# Patient Record
Sex: FEMALE | Race: BLACK OR AFRICAN AMERICAN | NOT HISPANIC OR LATINO | Employment: STUDENT | URBAN - METROPOLITAN AREA
[De-identification: names, ages, dates, MRNs, and addresses within clinical notes are randomized per-mention and may not be internally consistent; named-entity substitution may affect disease eponyms.]

---
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
## 2023-06-01 ENCOUNTER — OFFICE VISIT (OUTPATIENT)
Dept: FAMILY MEDICINE | Facility: CLINIC | Age: 23
End: 2023-06-01
Payer: COMMERCIAL

## 2023-06-01 VITALS
DIASTOLIC BLOOD PRESSURE: 77 MMHG | HEART RATE: 93 BPM | SYSTOLIC BLOOD PRESSURE: 113 MMHG | TEMPERATURE: 98.3 F | HEIGHT: 68 IN | RESPIRATION RATE: 11 BRPM | WEIGHT: 123.5 LBS | OXYGEN SATURATION: 100 % | BODY MASS INDEX: 18.72 KG/M2

## 2023-06-01 DIAGNOSIS — Z00.00 VISIT FOR PREVENTIVE HEALTH EXAMINATION: Primary | ICD-10-CM

## 2023-06-01 DIAGNOSIS — F33.1 MODERATE EPISODE OF RECURRENT MAJOR DEPRESSIVE DISORDER (H): ICD-10-CM

## 2023-06-01 DIAGNOSIS — Z13.29 SCREENING FOR THYROID DISORDER: ICD-10-CM

## 2023-06-01 DIAGNOSIS — D50.9 IRON DEFICIENCY ANEMIA, UNSPECIFIED IRON DEFICIENCY ANEMIA TYPE: ICD-10-CM

## 2023-06-01 DIAGNOSIS — Z11.4 SCREENING FOR HIV (HUMAN IMMUNODEFICIENCY VIRUS): ICD-10-CM

## 2023-06-01 DIAGNOSIS — Z13.0 SCREENING FOR DEFICIENCY ANEMIA: ICD-10-CM

## 2023-06-01 DIAGNOSIS — L21.9 SEBORRHEIC DERMATITIS: ICD-10-CM

## 2023-06-01 DIAGNOSIS — K90.0 CELIAC DISEASE: ICD-10-CM

## 2023-06-01 DIAGNOSIS — N94.6 DYSMENORRHEA: ICD-10-CM

## 2023-06-01 LAB
ERYTHROCYTE [DISTWIDTH] IN BLOOD BY AUTOMATED COUNT: 17.1 % (ref 10–15)
FERRITIN SERPL-MCNC: 6 NG/ML (ref 6–175)
HCT VFR BLD AUTO: 37.6 % (ref 35–47)
HGB BLD-MCNC: 11.4 G/DL (ref 11.7–15.7)
HIV 1+2 AB+HIV1 P24 AG SERPL QL IA: NONREACTIVE
IRON BINDING CAPACITY (ROCHE): 338 UG/DL (ref 240–430)
IRON SATN MFR SERPL: 5 % (ref 15–46)
IRON SERPL-MCNC: 18 UG/DL (ref 37–145)
MCH RBC QN AUTO: 22.7 PG (ref 26.5–33)
MCHC RBC AUTO-ENTMCNC: 30.3 G/DL (ref 31.5–36.5)
MCV RBC AUTO: 75 FL (ref 78–100)
PLATELET # BLD AUTO: 308 10E3/UL (ref 150–450)
RBC # BLD AUTO: 5.02 10E6/UL (ref 3.8–5.2)
TSH SERPL DL<=0.005 MIU/L-ACNC: 2.38 UIU/ML (ref 0.3–4.2)
WBC # BLD AUTO: 6.4 10E3/UL (ref 4–11)

## 2023-06-01 PROCEDURE — 83550 IRON BINDING TEST: CPT | Performed by: FAMILY MEDICINE

## 2023-06-01 PROCEDURE — 83540 ASSAY OF IRON: CPT | Performed by: FAMILY MEDICINE

## 2023-06-01 PROCEDURE — 36415 COLL VENOUS BLD VENIPUNCTURE: CPT | Performed by: FAMILY MEDICINE

## 2023-06-01 PROCEDURE — 82728 ASSAY OF FERRITIN: CPT | Performed by: FAMILY MEDICINE

## 2023-06-01 PROCEDURE — 85027 COMPLETE CBC AUTOMATED: CPT | Performed by: FAMILY MEDICINE

## 2023-06-01 PROCEDURE — 90471 IMMUNIZATION ADMIN: CPT | Performed by: FAMILY MEDICINE

## 2023-06-01 PROCEDURE — 90651 9VHPV VACCINE 2/3 DOSE IM: CPT | Performed by: FAMILY MEDICINE

## 2023-06-01 PROCEDURE — 99214 OFFICE O/P EST MOD 30 MIN: CPT | Mod: 25 | Performed by: FAMILY MEDICINE

## 2023-06-01 PROCEDURE — 87389 HIV-1 AG W/HIV-1&-2 AB AG IA: CPT | Performed by: FAMILY MEDICINE

## 2023-06-01 PROCEDURE — 84443 ASSAY THYROID STIM HORMONE: CPT | Performed by: FAMILY MEDICINE

## 2023-06-01 PROCEDURE — 99385 PREV VISIT NEW AGE 18-39: CPT | Mod: 25 | Performed by: FAMILY MEDICINE

## 2023-06-01 RX ORDER — TRIAMCINOLONE ACETONIDE 1 MG/G
CREAM TOPICAL 2 TIMES DAILY
Qty: 80 G | Refills: 1 | Status: SHIPPED | OUTPATIENT
Start: 2023-06-01 | End: 2023-11-03

## 2023-06-01 RX ORDER — ESCITALOPRAM OXALATE 10 MG/1
TABLET ORAL
Qty: 30 TABLET | Refills: 1 | Status: SHIPPED | OUTPATIENT
Start: 2023-06-01 | End: 2023-07-10

## 2023-06-01 ASSESSMENT — ENCOUNTER SYMPTOMS
JOINT SWELLING: 0
DIARRHEA: 0
NAUSEA: 0
HEMATURIA: 0
FREQUENCY: 0
COUGH: 0
FEVER: 0
DYSURIA: 0
HEMATOCHEZIA: 0
NERVOUS/ANXIOUS: 1
EYE PAIN: 0
DIZZINESS: 0
SORE THROAT: 0
PARESTHESIAS: 0
ARTHRALGIAS: 0
CHILLS: 0
BREAST MASS: 0
HEADACHES: 0
HEARTBURN: 0
SHORTNESS OF BREATH: 0
PALPITATIONS: 0
ABDOMINAL PAIN: 0
MYALGIAS: 0
CONSTIPATION: 0
WEAKNESS: 0

## 2023-06-01 ASSESSMENT — PAIN SCALES - GENERAL: PAINLEVEL: NO PAIN (0)

## 2023-06-01 NOTE — PATIENT INSTRUCTIONS
Naproxen up to 500 mg every 12 hours  Don't take ibuprofen on same day  Ok to combine acetaminophen

## 2023-06-01 NOTE — PROGRESS NOTES
"Addendum, June 4, 2023, 9:07 PM:  \"Iron levels are low. I suspect that because you have celiac, it is more difficult for your body to absorb iron. Symptoms of iron deficiency include fatigue and possibly depression. I recommend that you start taking an iron supplement once a day. I will send in a prescription. Potential side effects include constipation and dark / green poop. Let me know if the constipation is bothersome.    Thyroid test is normal.  HIV test is normal / negative.\"    Assessment/Plan    Preventive.  -see below orders for screening tests and immunizations    Lump of right earlobe.  Suspect seroma related to recent earring issues.  I do not think there is an active infection.  -Recommend observation.  I suspect that fluid collection will gradually resolve over the next few months    Ear itching.  Exam suggestive of seborrheic dermatitis.  -Start topical triamcinolone as needed    Major depression.  Poorly controlled.  -Check for anemia and hypothyroidism  -Start Lexapro 5 mg daily for 8 days, then 10 mg daily.  Risk of mood changes, GI upset and sexual dysfunction reviewed    Dysmenorrhea.  -Replace ibuprofen with naproxen  -Reviewed with patient that if symptoms are refractory to NSAIDs, we can consider hormonal therapy.  She is strongly hoping to avoid this    F/u in 4-6 weeks for mood.    Orders Placed This Encounter   Procedures     REVIEW OF HEALTH MAINTENANCE PROTOCOL ORDERS     HPV9 (GARDASIL 9)     HIV Antigen Antibody Combo     CBC with platelets     TSH with free T4 reflex     Iron and iron binding capacity     Ferritin     Ferritin     ----  Chief complaint: Physical and Establish Care    Social History     Social History Narrative    Updated 6/4/2023: Grew up in [place]. Lives with roommates. No pets. 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       Patient has been advised of split billing: yes.    Lump of right earlobe.  Patient had piercing in this area for more than 1 " "year.  She began to feel that something was wrong with the piercing.  She took out her earring about a month ago.  A lump appeared.  The lump has been gradually decreasing in size.  Denies recent fever.  The lump is not painful.    Intense menstrual cramps.  Menses are regular, about every 21 days.  Menses last for 5 to 6 days.  During the first 2 days, patient experiences very intense cramping.  She has been taking 400 mg of ibuprofen and some acetaminophen up to every 6 hours.  She would prefer to avoid treating this with hormonal therapy if possible.    Bilateral ear itching.  History of dandruff.    Celiac disease.  Symptoms are well controlled with gluten restriction.  Diagnosed via endoscopy.    History of anemia.  Patient reports that this resolved following celiac treatment.  She also has alpha thalassemia trait.    Depression.  Symptoms have been present for about 6 years.  Patient feels that she is not enjoying things as much.  She often feels sad and isolated.  She also notes intermittent anxiety, including a sensation of gagging when anxious.  This occurs about 2 times per week.  Patient denies recent thoughts of self-harm, though she has experienced these thoughts in the past.  She has never attempted suicide.  Patient feels comfortable sharing her emotions with her sister.  She went to counseling, but found this unhelpful.  She keeps a journal.  She has never tried a depression medication, but she would like to start one.    Patient uses cannabis about once per week during the academic year.  In the summer, she uses cannabis up to 3 times per week.  She occasionally drinks alcohol.  Denies other recent illicit drug use.    Not sexually active.  Denies concerns for pregnancy today.    Exam  /77 (BP Location: Right arm, Patient Position: Sitting, Cuff Size: Adult Regular)   Pulse 93   Temp 98.3  F (36.8  C) (Temporal)   Resp 11   Ht 1.723 m (5' 7.84\")   Wt 56 kg (123 lb 8 oz)   LMP 05/12/2023 " (Exact Date)   SpO2 100%   BMI 18.87 kg/m    Patient's last menstrual period was 05/12/2023 (exact date).    Dryness of skin at entrance to external auditory canals bilaterally.  Small amount of cerumen in EACs.  Visualized portions of tympanic membranes appear normal.    Pea-sized lump of right earlobe.  Nontender.  Punctum present.    No cervical adenopathy.  Lung fields clear to auscultation bilaterally.  Heart with regular rate and rhythm, no murmurs.

## 2023-06-04 PROBLEM — L21.9 SEBORRHEIC DERMATITIS: Status: ACTIVE | Noted: 2023-06-04

## 2023-06-04 PROBLEM — F33.1 MODERATE EPISODE OF RECURRENT MAJOR DEPRESSIVE DISORDER (H): Status: ACTIVE | Noted: 2023-06-04

## 2023-06-04 PROBLEM — D50.9 IRON DEFICIENCY ANEMIA, UNSPECIFIED IRON DEFICIENCY ANEMIA TYPE: Status: ACTIVE | Noted: 2023-06-04

## 2023-06-04 PROBLEM — K90.0 CELIAC DISEASE: Status: ACTIVE | Noted: 2023-06-04

## 2023-06-04 RX ORDER — FERROUS SULFATE 325(65) MG
325 TABLET ORAL
Qty: 90 TABLET | Refills: 3 | Status: SHIPPED | OUTPATIENT
Start: 2023-06-04 | End: 2023-07-11

## 2023-07-09 ASSESSMENT — ANXIETY QUESTIONNAIRES
IF YOU CHECKED OFF ANY PROBLEMS ON THIS QUESTIONNAIRE, HOW DIFFICULT HAVE THESE PROBLEMS MADE IT FOR YOU TO DO YOUR WORK, TAKE CARE OF THINGS AT HOME, OR GET ALONG WITH OTHER PEOPLE: NOT DIFFICULT AT ALL
GAD7 TOTAL SCORE: 5
GAD7 TOTAL SCORE: 5
7. FEELING AFRAID AS IF SOMETHING AWFUL MIGHT HAPPEN: SEVERAL DAYS
2. NOT BEING ABLE TO STOP OR CONTROL WORRYING: SEVERAL DAYS
6. BECOMING EASILY ANNOYED OR IRRITABLE: NOT AT ALL
3. WORRYING TOO MUCH ABOUT DIFFERENT THINGS: SEVERAL DAYS
5. BEING SO RESTLESS THAT IT IS HARD TO SIT STILL: NOT AT ALL
4. TROUBLE RELAXING: SEVERAL DAYS
1. FEELING NERVOUS, ANXIOUS, OR ON EDGE: SEVERAL DAYS

## 2023-07-10 ENCOUNTER — OFFICE VISIT (OUTPATIENT)
Dept: FAMILY MEDICINE | Facility: CLINIC | Age: 23
End: 2023-07-10
Payer: COMMERCIAL

## 2023-07-10 VITALS
OXYGEN SATURATION: 99 % | RESPIRATION RATE: 14 BRPM | BODY MASS INDEX: 18.41 KG/M2 | HEART RATE: 101 BPM | WEIGHT: 121.5 LBS | SYSTOLIC BLOOD PRESSURE: 116 MMHG | TEMPERATURE: 97.4 F | DIASTOLIC BLOOD PRESSURE: 76 MMHG | HEIGHT: 68 IN

## 2023-07-10 DIAGNOSIS — F33.1 MODERATE EPISODE OF RECURRENT MAJOR DEPRESSIVE DISORDER (H): ICD-10-CM

## 2023-07-10 DIAGNOSIS — D50.9 IRON DEFICIENCY ANEMIA, UNSPECIFIED IRON DEFICIENCY ANEMIA TYPE: ICD-10-CM

## 2023-07-10 DIAGNOSIS — F41.1 GAD (GENERALIZED ANXIETY DISORDER): Primary | ICD-10-CM

## 2023-07-10 DIAGNOSIS — L72.0 EPIDERMAL CYST OF EAR: ICD-10-CM

## 2023-07-10 PROBLEM — D56.3 ALPHA THALASSEMIA TRAIT: Status: ACTIVE | Noted: 2023-07-10

## 2023-07-10 PROCEDURE — 99214 OFFICE O/P EST MOD 30 MIN: CPT | Performed by: FAMILY MEDICINE

## 2023-07-10 RX ORDER — HYDROXYZINE HYDROCHLORIDE 10 MG/1
10-20 TABLET, FILM COATED ORAL 3 TIMES DAILY PRN
Qty: 40 TABLET | Refills: 1 | Status: SHIPPED | OUTPATIENT
Start: 2023-07-10 | End: 2023-11-01

## 2023-07-10 RX ORDER — ESCITALOPRAM OXALATE 10 MG/1
10 TABLET ORAL DAILY
Qty: 90 TABLET | Refills: 1 | Status: SHIPPED | OUTPATIENT
Start: 2023-07-10 | End: 2023-07-26

## 2023-07-10 ASSESSMENT — ANXIETY QUESTIONNAIRES: GAD7 TOTAL SCORE: 5

## 2023-07-10 ASSESSMENT — PAIN SCALES - GENERAL: PAINLEVEL: NO PAIN (0)

## 2023-07-10 NOTE — PROGRESS NOTES
Assessment/Plan.    Depression.  Improving.  Continue Lexapro at 10 mg daily.    Generalized anxiety.  No significant improvement with Lexapro.  Limit caffeine intake to no more than 100 mg daily.  Start hydroxyzine 10 to 20 mg up to 3 times daily as needed.  Risk of sedation reviewed.  Encouraged patient not to use hydroxyzine on days that she consumes alcohol.    Mild iron deficiency.  Likely secondary to celiac disease.  Patient reports that iron levels were much lower 1 to 2 years ago and that she responded well to an iron infusion through [facility].  She has not tolerated oral iron in the past.  Recent ferritin level was borderline-low.  Encouraged patient to restart oral iron at a lower dose; she can take 65 mg of elemental iron once weekly or can use a lower-dose formulation more often.    Epidermal cyst of right earlobe.  Unfortunately, this has not self-resolved.  Patient desires excision.  Will refer to dermatology.    F/u in 2 months.    Orders Placed This Encounter   Procedures     Adult Dermatology Referral     ----  Chief complaint: Follow Up (From 6/1) and Recheck Medication    Social History     Social History Narrative    Updated 6/4/2023: Grew up in [place]. Lives with roommates. No pets. 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     At last visit, we started Lexapro.  Patient reports that depressive symptoms have improved.  She initially experienced a dull headache and then also noticed some GI upset following dose increase, but side effects have now resolved.    Anxiety symptoms are about the same.  Patient experiences episodes of intense anxiety about 4 times per week.  These sometimes interfere with her ability to sleep.    Cannabis use is stable.  Patient does not feel that cannabis use is a problem.  She reports that she does not use cannabis during the school year.    Patient has been taking melatonin to help with sleep.  She occasionally takes Allegra.  She also uses  "caffeine pills.  Recently, she has been taking about 100 mg of caffeine per day.  During the school year, she takes up to 300 mg/day.    Exam  /76   Pulse 101   Temp 97.4  F (36.3  C) (Temporal)   Resp 14   Ht 1.73 m (5' 8.11\")   Wt 55.1 kg (121 lb 8 oz)   LMP 07/03/2023 (Exact Date)   SpO2 99%   BMI 18.41 kg/m    Patient's last menstrual period was 07/03/2023 (exact date).     Pea-sized lump of right earlobe, nontender.  "

## 2023-07-10 NOTE — PROGRESS NOTES
"  {PROVIDER CHARTING PREFERENCE:704578}    Matilde Art is a 22 year old, presenting for the following health issues:  Follow Up (From 6/1) and Recheck Medication        7/10/2023     9:52 AM   Additional Questions   Roomed by Monica Pereyra     History of Present Illness       Mental Health Follow-up:  Patient presents to follow-up on Depression & Anxiety.Patient's depression since last visit has been:  Better  The patient is not having other symptoms associated with depression.  Patient's anxiety since last visit has been:  No change  The patient is not having other symptoms associated with anxiety.  Any significant life events: No  Patient is feeling anxious or having panic attacks.  Patient has no concerns about alcohol or drug use.    She eats 2-3 servings of fruits and vegetables daily.She consumes 0 sweetened beverage(s) daily.She exercises with enough effort to increase her heart rate 20 to 29 minutes per day.  She exercises with enough effort to increase her heart rate 5 days per week.   She is taking medications regularly.  Today's WALTER-7 Score: 5       {SUPERLIST (Optional):174018}  {additonal problems for provider to add (Optional):865401}      Review of Systems   {ROS COMP (Optional):484431}      Objective    /76   Pulse 101   Temp 97.4  F (36.3  C) (Temporal)   Resp 14   Ht 1.73 m (5' 8.11\")   Wt 55.1 kg (121 lb 8 oz)   LMP 07/03/2023 (Exact Date)   SpO2 99%   BMI 18.41 kg/m    Body mass index is 18.41 kg/m .  Physical Exam   {Exam List (Optional):283892}    {Diagnostic Test Results (Optional):399653}    {AMBULATORY ATTESTATION (Optional):565439}            "

## 2023-07-11 RX ORDER — FERROUS SULFATE 325(65) MG
325 TABLET ORAL WEEKLY
Start: 2023-07-11 | End: 2023-09-18

## 2023-07-26 DIAGNOSIS — F33.1 MODERATE EPISODE OF RECURRENT MAJOR DEPRESSIVE DISORDER (H): ICD-10-CM

## 2023-07-26 RX ORDER — ESCITALOPRAM OXALATE 10 MG/1
10 TABLET ORAL DAILY
Qty: 90 TABLET | Refills: 1 | Status: SHIPPED | OUTPATIENT
Start: 2023-07-26 | End: 2024-09-25

## 2023-07-26 NOTE — TELEPHONE ENCOUNTER
"Requested Prescriptions   Pending Prescriptions Disp Refills    escitalopram (LEXAPRO) 10 MG tablet 90 tablet 1     Sig: Take 1 tablet (10 mg) by mouth daily       SSRIs Protocol Failed - 7/26/2023 11:35 AM        Failed - PHQ-9 score less than 5 in past 6 months     Please review last PHQ-9 score.           Passed - Medication is active on med list        Passed - Patient is age 18 or older        Passed - No active pregnancy on record        Passed - No positive pregnancy test in last 12 months        Passed - Recent (6 mo) or future (30 days) visit within the authorizing provider's specialty     Patient had office visit in the last 6 months or has a visit in the next 30 days with authorizing provider or within the authorizing provider's specialty.  See \"Patient Info\" tab in inbasket, or \"Choose Columns\" in Meds & Orders section of the refill encounter.                Routing refill request to provider for review/approval because medication did not pass protocol.    Pt was last seen on 07/10/23    Carol Kelly RN  [facility]   "

## 2023-09-18 ENCOUNTER — OFFICE VISIT (OUTPATIENT)
Dept: OBGYN | Facility: CLINIC | Age: 23
End: 2023-09-18
Attending: OBSTETRICS & GYNECOLOGY
Payer: COMMERCIAL

## 2023-09-18 VITALS
SYSTOLIC BLOOD PRESSURE: 122 MMHG | HEIGHT: 68 IN | DIASTOLIC BLOOD PRESSURE: 85 MMHG | BODY MASS INDEX: 19.85 KG/M2 | HEART RATE: 76 BPM | WEIGHT: 131 LBS

## 2023-09-18 DIAGNOSIS — N94.6 DYSMENORRHEA: Primary | ICD-10-CM

## 2023-09-18 PROCEDURE — G0463 HOSPITAL OUTPT CLINIC VISIT: HCPCS | Performed by: OBSTETRICS & GYNECOLOGY

## 2023-09-18 PROCEDURE — 99203 OFFICE O/P NEW LOW 30 MIN: CPT | Performed by: OBSTETRICS & GYNECOLOGY

## 2023-09-18 ASSESSMENT — PAIN SCALES - GENERAL: PAINLEVEL: MODERATE PAIN (4)

## 2023-09-18 NOTE — PATIENT INSTRUCTIONS
Lysteda - This medication is primarily used for heavy periods, but by shortening the amount of bleeding we may indirectly help with the pain of your periods.    https://www.aafp.org/pubs/afp/issues/2011/1015/p883.html    Try taking a dose or two of ibuprofen the day before you anticipate your period pain coming back.    Consider a Mirena/progestin IUD for pain.  https://www.[employer].Joome/Mirena_Patient_Brochure_Digital.pdf?inline    After your ultrasound, we can think about whether we should move forward with a diagnostic laparoscopy to look for endometriosis.    Thank you for trusting us with your care!     If you need to contact us for questions about:  Symptoms, Scheduling & Medical Questions; Non-urgent (2-3 day response) [facility] message, Urgent (needing response today) [phone] (if after 3:30pm next day response)   Prescriptions: Please call your Pharmacy   Billing: Jeff [phone] or [facility]:[phone]

## 2023-09-18 NOTE — PROGRESS NOTES
Chief Complaint   Patient presents with    Establish Care     C/O cramping    Gabriela Quevedo LPN

## 2023-09-18 NOTE — LETTER
9/18/2023       RE: Kaelyn Munoz  [address] [facility] [address]     Dear Colleague,    Thank you for referring your patient, Kaelyn Munoz, to the [facility] [place] [facility]. Please see a copy of my visit note below.    Chief Complaint   Patient presents with    Establish Care     C/O sherwin Quevedo LPN     Since age 16    Started having periods at age 13    Starting in high school, they have been painful, havent changed since    Roughly monthly, regular,     Last about 5-6 days  Pain severe is first 2 days  Takes pain killers the first 3 days  Not heavy  Blood doesn't bother her  Changes super tampons, changes every 5 hours (first 2 days)    Stomach is painful     No dyschezia - stomach hurts   No dysuria  No dyspareunia    A thal trait - no treatments. Really anemic, has had iron infusions in the past  Celiac disease - diagnosed July 2021  No major complications from celiac    Endoscopy related to he celiac diagnosis    Left fractured elbow    G0  No pap smear history     Family history  Breast cancer dad's side, great aunt (grandpa's sister)  Dad has high blood pressure and gout  Painful periods? No     Allergies  None         [facility]  Gynecology Consult Visit    SUBJECTIVE    Kaelyn Munoz is a 23 year old G0 who is here for dysmenorrhea.    Kaelyn describes that periods have been painful since she was 16. She experiences monthly menses which last about 5-6 days. The first two days are very painful. She does use tylenol and ibuprofen about every 4-6 hours for the first three days with minimal relief. She does not think the flow is heavy, on the first two days she uses a super tampon which she changes about every 5 hours. She states the pain occurs in her stomach. No associated dyschezia, dysuria, or dyspareunia.     She hasn't had any previous lab or imaging evaluation  "for these painful periods, but her primary care provider suggested the use of tylenol/ibuprofen. She was offered hormonal contraception but she is not interested in this as she is worried that it could affect her mood. She has similarly received this advice from her grandfather, who is an OBGYN.    Her LMP is: Patient's last menstrual period was 2023.    PAST MEDICAL HISTORY  Past Medical History:   Diagnosis Date    Alpha thalassemia trait     Celiac disease      MEDICATIONS  Current Outpatient Medications   Medication    escitalopram (LEXAPRO) 10 MG tablet    hydrOXYzine (ATARAX) 10 MG tablet    triamcinolone (KENALOG) 0.1 % external cream     No current facility-administered medications for this visit.     ALLERGIES  No Known Allergies    OBSTETRIC/GYNECOLOGIC HISTORY  Menarche: 13  Period cycle/length/flow/associated symptoms: monthly, painful, lasts 5-6 days  Current contraception: none  No prior pap smears    OB History    Para Term  AB Living   0 0 0 0 0 0   SAB IAB Ectopic Multiple Live Births   0 0 0 0 0       PAST SURGICAL HISTORY   Past Surgical History:   Procedure Laterality Date    EGD  2021    WISDOM TOOTH EXTRACTION         SOCIAL HISTORY    Social History     Tobacco Use    Smoking status: Never    Smokeless tobacco: Never   Vaping Use    Vaping Use: Never used   Substance Use Topics    Alcohol use: Yes    Drug use: Yes     Types: Marijuana       FAMILY HISTORY    Family History   Problem Relation Age of Onset    Gout Father     Hypertension Father    Remote breast cancer history - paternal great aunt    OBJECTIVE  /85   Pulse 76   Ht 1.727 m (5' 8\")   Wt 59.4 kg (131 lb)   LMP 2023   BMI 19.92 kg/m      General: Alert, without distress  HEENT: normocephalic, without obvious abnormality   Exam deferred today    LABS:   Component      Latest Ref Rng 2023  11:00 AM 2023  11:01 AM 2023  11:33 AM   WBC      4.0 - 11.0 10e3/uL 6.4      RBC Count      " 3.80 - 5.20 10e6/uL 5.02      Hemoglobin      11.7 - 15.7 g/dL 11.4 (L)      Hematocrit      35.0 - 47.0 % 37.6      MCV      78 - 100 fL 75 (L)      MCH      26.5 - 33.0 pg 22.7 (L)      MCHC      31.5 - 36.5 g/dL 30.3 (L)      RDW      10.0 - 15.0 % 17.1 (H)      Platelet Count      150 - 450 10e3/uL 308      Iron      37 - 145 ug/dL  18 (L)     Iron Binding Capacity      240 - 430 ug/dL  338     Iron Sat Index      15 - 46 %  5 (L)     TSH      0.30 - 4.20 uIU/mL  2.38     Ferritin      6 - 175 ng/mL   6           ASSESSMENT  Kaelyn Munoz is a 23 year old G0 with dysmenorrhea.    PLAN   -discussed common causes of dysmenorrhea, including primary dysmenorrhea, endometriosis, adenomyosis, fibroids, among other causes. Agreeable to begin workup with US. Depending on result, Kaelyn would consider a diagnostic laparoscopy.  -discussed trying ibuprofen the day prior to the anticipation of menses, to interrupt the inflammatory cytokines that cause painful menses. However, I believe that this is likely to be insufficient in management. Discussed the following options:   -lysteda: reviewed that it is primarily used for management of heavy periods, but that by reducing the amount of bleeding, we may indirectly improve pain   -reviewed the role of hormonal contraception, and the use of ovulatory suppression in individuals with painful menses. Discussed the data that is known about hormonal contraception and mood. Discussed the use of a progestin IUD, which have been shown to both improve pain while also minimizing systemic/brain exposure to hormones. Kaelyn will continue to think about this.    Kaelyn will think about these management options. We will discuss again if she is interested in anything after the ultrasound.    Antonietta Andersen MD, MSCI, FACOG    [facility]  September 18, 2023

## 2023-09-27 ENCOUNTER — ANCILLARY PROCEDURE (OUTPATIENT)
Dept: ULTRASOUND IMAGING | Facility: CLINIC | Age: 23
End: 2023-09-27
Attending: OBSTETRICS & GYNECOLOGY
Payer: COMMERCIAL

## 2023-09-27 DIAGNOSIS — N94.6 DYSMENORRHEA: ICD-10-CM

## 2023-09-27 PROCEDURE — 76830 TRANSVAGINAL US NON-OB: CPT | Mod: 26 | Performed by: OBSTETRICS & GYNECOLOGY

## 2023-09-27 PROCEDURE — 76830 TRANSVAGINAL US NON-OB: CPT

## 2023-10-02 ENCOUNTER — TELEPHONE (OUTPATIENT)
Dept: OBGYN | Facility: CLINIC | Age: 23
End: 2023-10-02
Payer: COMMERCIAL

## 2023-10-02 ENCOUNTER — PREP FOR PROCEDURE (OUTPATIENT)
Dept: OBGYN | Facility: CLINIC | Age: 23
End: 2023-10-02
Payer: COMMERCIAL

## 2023-10-02 DIAGNOSIS — N92.0 MENORRHAGIA WITH REGULAR CYCLE: Primary | ICD-10-CM

## 2023-10-02 DIAGNOSIS — N84.0 ENDOMETRIAL POLYP: ICD-10-CM

## 2023-10-02 DIAGNOSIS — N94.6 MENSES PAINFUL: ICD-10-CM

## 2023-10-02 DIAGNOSIS — N92.0 MENORRHAGIA WITH REGULAR CYCLE: ICD-10-CM

## 2023-10-02 DIAGNOSIS — N94.6 MENSES PAINFUL: Primary | ICD-10-CM

## 2023-10-02 RX ORDER — ACETAMINOPHEN 325 MG/1
975 TABLET ORAL ONCE
Status: CANCELLED | OUTPATIENT
Start: 2023-10-02 | End: 2023-10-02

## 2023-10-02 NOTE — TELEPHONE ENCOUNTER
Called to review US which shows likely endometrial polyp.    Discussed that polyp is definitely contributing to heaviness of menses, but may or may not be the primary cause of the pain with menses. Offered two routes of evaluation/treatment:     1) only hysteroscopy/polypectomy, then see what symptoms are improved/how much pain is resolved.  2) combined diagnostic laparoscopy with hysteroscopy, and rule out endometriosis along with removing the polyp.    Kaelyn prefers the latter option. Case request entered. Will need a pre-op physical within 30d of surgery.    Antonietta Andersen MD, MSCI, FACOG    [facility]  October 2, 2023

## 2023-10-04 ENCOUNTER — MYC MEDICAL ADVICE (OUTPATIENT)
Dept: OBGYN | Facility: CLINIC | Age: 23
End: 2023-10-04
Payer: COMMERCIAL

## 2023-10-06 PROBLEM — N94.6 MENSES PAINFUL: Status: ACTIVE | Noted: 2023-10-02

## 2023-10-06 PROBLEM — N84.0 ENDOMETRIAL POLYP: Status: ACTIVE | Noted: 2023-10-02

## 2023-10-06 PROBLEM — N92.0 MENORRHAGIA WITH REGULAR CYCLE: Status: ACTIVE | Noted: 2023-10-02

## 2023-10-09 ENCOUNTER — TELEPHONE (OUTPATIENT)
Dept: OBGYN | Facility: CLINIC | Age: 23
End: 2023-10-09
Payer: COMMERCIAL

## 2023-10-09 NOTE — TELEPHONE ENCOUNTER
SHON for patient to call back to schedule procedure. Recent phone call with patient she expressed wanting a Friday date for her procedure. Writer has a few openings for Friday before the new year.     Sandhya CORTES

## 2023-11-01 ENCOUNTER — OFFICE VISIT (OUTPATIENT)
Dept: FAMILY MEDICINE | Facility: CLINIC | Age: 23
End: 2023-11-01
Payer: COMMERCIAL

## 2023-11-01 VITALS
HEIGHT: 69 IN | HEART RATE: 66 BPM | OXYGEN SATURATION: 98 % | BODY MASS INDEX: 20.73 KG/M2 | TEMPERATURE: 98.3 F | RESPIRATION RATE: 14 BRPM | SYSTOLIC BLOOD PRESSURE: 104 MMHG | WEIGHT: 140 LBS | DIASTOLIC BLOOD PRESSURE: 68 MMHG

## 2023-11-01 DIAGNOSIS — D56.3 ALPHA THALASSEMIA TRAIT: ICD-10-CM

## 2023-11-01 DIAGNOSIS — Z23 NEED FOR VACCINATION: ICD-10-CM

## 2023-11-01 DIAGNOSIS — Z01.818 PREOPERATIVE EXAMINATION: Primary | ICD-10-CM

## 2023-11-01 DIAGNOSIS — D50.9 IRON DEFICIENCY ANEMIA, UNSPECIFIED IRON DEFICIENCY ANEMIA TYPE: ICD-10-CM

## 2023-11-01 DIAGNOSIS — N94.6 MENSES PAINFUL: ICD-10-CM

## 2023-11-01 PROCEDURE — 99214 OFFICE O/P EST MOD 30 MIN: CPT | Mod: 25 | Performed by: FAMILY MEDICINE

## 2023-11-01 PROCEDURE — 90471 IMMUNIZATION ADMIN: CPT | Performed by: FAMILY MEDICINE

## 2023-11-01 PROCEDURE — 90480 ADMN SARSCOV2 VAC 1/ONLY CMP: CPT | Performed by: FAMILY MEDICINE

## 2023-11-01 PROCEDURE — 91320 SARSCV2 VAC 30MCG TRS-SUC IM: CPT | Performed by: FAMILY MEDICINE

## 2023-11-01 PROCEDURE — 90686 IIV4 VACC NO PRSV 0.5 ML IM: CPT | Performed by: FAMILY MEDICINE

## 2023-11-01 ASSESSMENT — ENCOUNTER SYMPTOMS
EYE PAIN: 0
COUGH: 0
MYALGIAS: 0
DIZZINESS: 0
FREQUENCY: 0
BRUISES/BLEEDS EASILY: 0
ABDOMINAL PAIN: 0
ALLERGIC/IMMUNOLOGIC NEGATIVE: 1
FEVER: 0
PSYCHIATRIC NEGATIVE: 1
CHILLS: 0
PALPITATIONS: 0
DYSURIA: 0
SHORTNESS OF BREATH: 0
CONSTIPATION: 0
ARTHRALGIAS: 0
HEADACHES: 0
SORE THROAT: 0
WEAKNESS: 0
DIARRHEA: 0

## 2023-11-01 ASSESSMENT — PATIENT HEALTH QUESTIONNAIRE - PHQ9
10. IF YOU CHECKED OFF ANY PROBLEMS, HOW DIFFICULT HAVE THESE PROBLEMS MADE IT FOR YOU TO DO YOUR WORK, TAKE CARE OF THINGS AT HOME, OR GET ALONG WITH OTHER PEOPLE: NOT DIFFICULT AT ALL
SUM OF ALL RESPONSES TO PHQ QUESTIONS 1-9: 2
SUM OF ALL RESPONSES TO PHQ QUESTIONS 1-9: 2

## 2023-11-01 ASSESSMENT — PAIN SCALES - GENERAL: PAINLEVEL: NO PAIN (0)

## 2023-11-01 NOTE — PROGRESS NOTES
[facility]  [address] [place] [address] SO  [address]  [facility] [address]  Phone: [phone]  Fax: [phone]  Primary Provider: Kemar Rodriguez  Pre-op Performing Provider: ELAINE TORREZ    PREOPERATIVE EVALUATION:  Today's date: 11/1/2023    Kaelyn is a 23 year old female who presents for a preoperative evaluation.      11/1/2023     8:48 AM   Additional Questions   Roomed by Jerome OTTO RN       Surgical Information:  Surgery/Procedure: LAPASCOPY, DIAGNOSTIC, possible fulguration or excision of endometriosis; hysteroscopy with morcellation of endometrial polyp AND Dilation and curettage, operative hysteroscopy with morcellator   [place] [facility] [place]geon: SILVIA LUCERO MD  Surgery Date: 11/3/2023  Time of Surgery: 9:10 AM  Where patient plans to recover: At home with family  Fax number for surgical facility: Note does not need to be faxed, will be available electronically in Epic.    Assessment & Plan     The proposed surgical procedure is considered INTERMEDIATE risk.    Preoperative examination  Menses painful  Low risk for intermediate risk procedure   Pt reports that she usually needs additional doses of anesthesia at the dentist.  Had endoscopy without concern. Anesthesia to be cognizant of this    Need for vaccination  - INFLUENZA VACCINE IM > 6 MONTHS VALENT IIV4 (AFLURIA/FLUZONE)  - COVID-19 12+ (2023-24) ([employer])    Alpha thalassemia trait  Iron deficiency anemia, unspecified iron deficiency anemia type  - CBC with platelets; Future     - No identified additional risk factors other than previously addressed    RECOMMENDATION:  APPROVAL GIVEN to proceed with proposed procedure, without further diagnostic evaluation.    Subjective       HPI related to upcoming procedure:     Painful menses  Looking for endometriosis     Hysteroscopy and laparoscopy     Able to do 4+ METS regularly without shortness of breath         11/1/2023     8:41 AM   Preop Questions   1. Have you  ever had a heart attack or stroke? No   2. Have you ever had surgery on your heart or blood vessels, such as a stent placement, a coronary artery bypass, or surgery on an artery in your head, neck, heart, or legs? No   3. Do you have chest pain with activity? No   4. Do you have a history of  heart failure? No   5. Do you currently have a cold, bronchitis or symptoms of other infection? No   6. Do you have a cough, shortness of breath, or wheezing? No   7. Do you or anyone in your family have previous history of blood clots? No   8. Do you or does anyone in your family have a serious bleeding problem such as prolonged bleeding following surgeries or cuts? No   9. Have you ever had problems with anemia or been told to take iron pills? YES - alpha thalassemia trait   Checking cbc today    10. Have you had any abnormal blood loss such as black, tarry or bloody stools, or abnormal vaginal bleeding? No   11. Have you ever had a blood transfusion? No   12. Are you willing to have a blood transfusion if it is medically needed before, during, or after your surgery? Yes   13. Have you or any of your relatives ever had problems with anesthesia? YES - she reports that she usually needs additional anesthesia compared to others.  She has been aware through several procedures in the past.     14. Do you have sleep apnea, excessive snoring or daytime drowsiness? No   15. Do you have any artifical heart valves or other implanted medical devices like a pacemaker, defibrillator, or continuous glucose monitor? No   16. Do you have artificial joints? No   17. Are you allergic to latex? No   18. Is there any chance that you may be pregnant? No       Health Care Directive:  Patient does not have a Health Care Directive or Living Will:   FULL CODE     Preoperative Review of :   reviewed - no record of controlled substances prescribed.    Review of Systems   Constitutional:  Negative for chills and fever.   HENT:  Negative for  congestion, ear pain and sore throat.    Eyes:  Negative for pain and visual disturbance.   Respiratory:  Negative for cough and shortness of breath.    Cardiovascular:  Negative for chest pain, palpitations and peripheral edema.   Gastrointestinal:  Negative for abdominal pain, constipation and diarrhea.   Endocrine: Negative for polyuria.   Genitourinary:  Negative for dysuria, frequency and vaginal discharge.   Musculoskeletal:  Negative for arthralgias and myalgias.   Skin:  Negative for rash.   Allergic/Immunologic: Negative.    Neurological:  Negative for dizziness, weakness and headaches.   Hematological:  Does not bruise/bleed easily.   Psychiatric/Behavioral: Negative.         Patient Active Problem List    Diagnosis Date Noted    Menorrhagia with regular cycle 10/02/2023     Priority: Medium    Endometrial polyp 10/02/2023     Priority: Medium    Menses painful 10/02/2023     Priority: Medium    Alpha thalassemia trait 07/10/2023     Priority: Medium    Celiac disease 06/04/2023     Priority: Medium    Seborrheic dermatitis 06/04/2023     Priority: Medium    Moderate episode of recurrent major depressive disorder (H) 06/04/2023     Priority: Medium    Iron deficiency anemia, unspecified iron deficiency anemia type 06/04/2023     Priority: Medium      Past Medical History:   Diagnosis Date    Alpha thalassemia trait     Celiac disease      Past Surgical History:   Procedure Laterality Date    EGD  07/2021    WISDOM TOOTH EXTRACTION       Current Outpatient Medications   Medication Sig Dispense Refill    escitalopram (LEXAPRO) 10 MG tablet Take 1 tablet (10 mg) by mouth daily 90 tablet 1    triamcinolone (KENALOG) 0.1 % external cream Apply topically 2 times daily Apply to ear canal as needed for itching. (Patient not taking: Reported on 11/1/2023) 80 g 1       No Known Allergies     Social History     Tobacco Use    Smoking status: Never    Smokeless tobacco: Never   Substance Use Topics    Alcohol use: Yes  "    Family History   Problem Relation Age of Onset    Gout Father     Hypertension Father      History   Drug Use    Types: Marijuana         Objective     /68 (BP Location: Left arm, Patient Position: Sitting, Cuff Size: Adult Regular)   Pulse 66   Temp 98.3  F (36.8  C) (Temporal)   Resp 14   Ht 1.753 m (5' 9\")   Wt 63.5 kg (140 lb)   LMP 09/16/2023   SpO2 98%   BMI 20.67 kg/m      Physical Exam  Constitutional:       General: She is not in acute distress.  HENT:      Head: Normocephalic.      Right Ear: Tympanic membrane normal.      Left Ear: Tympanic membrane normal.      Mouth/Throat:      Mouth: Mucous membranes are moist.      Pharynx: Oropharynx is clear.   Eyes:      General: No scleral icterus.  Cardiovascular:      Rate and Rhythm: Normal rate and regular rhythm.      Heart sounds: Normal heart sounds.   Pulmonary:      Effort: No respiratory distress.      Breath sounds: Normal breath sounds.   Abdominal:      General: Abdomen is flat. Bowel sounds are normal.      Palpations: Abdomen is soft.   Musculoskeletal:      Cervical back: No tenderness.      Right lower leg: No edema.      Left lower leg: No edema.   Lymphadenopathy:      Cervical: No cervical adenopathy.   Skin:     General: Skin is warm.   Neurological:      General: No focal deficit present.      Mental Status: She is alert.   Psychiatric:         Mood and Affect: Mood normal.         Behavior: Behavior normal.         Recent Labs   Lab Test 06/01/23  1100   HGB 11.4*           Diagnostics:  Labs pending at this time.  Results will be reviewed when available.   No EKG required, no history of coronary heart disease, significant arrhythmia, peripheral arterial disease or other structural heart disease.    Revised Cardiac Risk Index (RCRI):  The patient has the following serious cardiovascular risks for perioperative complications:   - No serious cardiac risks = 0 points     RCRI Interpretation: 0 points: Class I (very low " risk - 0.4% complication rate)         Signed Electronically by: Heath Childers DO  Copy of this evaluation report is provided to requesting physician.

## 2023-11-02 ENCOUNTER — ANESTHESIA EVENT (OUTPATIENT)
Dept: SURGERY | Facility: AMBULATORY SURGERY CENTER | Age: 23
End: 2023-11-02
Payer: COMMERCIAL

## 2023-11-02 NOTE — ANESTHESIA PREPROCEDURE EVALUATION
Anesthesia Pre-Procedure Evaluation    Patient: Kaelyn Munoz   MRN: [id number] : 2000        Procedure : Procedure(s):  diagnostic laparoscopy, possible fulguration or excision of endometriosis; hysteroscopy with morcellation of endometrial polyp  Dilation and curettage, operative hysteroscopy with morcellator          Past Medical History:   Diagnosis Date    Alpha thalassemia trait     Celiac disease       Past Surgical History:   Procedure Laterality Date    EGD  2021    WISDOM TOOTH EXTRACTION        No Known Allergies   Social History     Tobacco Use    Smoking status: Never    Smokeless tobacco: Never   Substance Use Topics    Alcohol use: Yes      Wt Readings from Last 1 Encounters:   23 63.5 kg (140 lb)        Anesthesia Evaluation   Pt has had prior anesthetic.     No history of anesthetic complications       ROS/MED HX  ENT/Pulmonary:  - neg pulmonary ROS     Neurologic:  - neg neurologic ROS     Cardiovascular:  - neg cardiovascular ROS     METS/Exercise Tolerance: >4 METS    Hematologic:     (+)      anemia,          Musculoskeletal:  - neg musculoskeletal ROS     GI/Hepatic: Comment: Celiac disease      Renal/Genitourinary:  - neg Renal ROS     Endo:  - neg endo ROS     Psychiatric/Substance Use:     (+) psychiatric history depression       Infectious Disease:  - neg infectious disease ROS     Malignancy:  - neg malignancy ROS     Other:  - neg other ROS          Physical Exam    Airway  airway exam normal      Mallampati: I   TM distance: > 3 FB   Neck ROM: full   Mouth opening: > 3 cm    Respiratory Devices and Support         Dental       (+) Completely normal teeth      Cardiovascular   cardiovascular exam normal       Rhythm and rate: regular and normal     Pulmonary   pulmonary exam normal        breath sounds clear to auscultation           OUTSIDE LABS:  CBC:   Lab Results   Component Value Date    WBC 6.4 2023    WBC 6.6 2021    HGB 11.4 (L) 2023     "HGB 8.2 (L) 07/22/2021    HCT 37.6 06/01/2023    HCT 29.4 (L) 07/22/2021     06/01/2023     07/22/2021     BMP:   Lab Results   Component Value Date     06/01/2021    POTASSIUM 4.1 06/01/2021    CHLORIDE 103 06/01/2021    CO2 21 (L) 06/01/2021    BUN 14 06/01/2021    CR 0.67 06/01/2021    GLC 84 06/01/2021     COAGS: No results found for: \"PTT\", \"INR\", \"FIBR\"  POC: No results found for: \"BGM\", \"HCG\", \"HCGS\"  HEPATIC:   Lab Results   Component Value Date    ALBUMIN 4.5 06/01/2021    PROTTOTAL 7.6 06/01/2021    ALT <9 06/01/2021    AST 13 06/01/2021    ALKPHOS 116 06/01/2021    BILITOTAL 0.7 06/01/2021     OTHER:   Lab Results   Component Value Date    YASIR 9.5 06/01/2021    TSH 2.38 06/01/2023       Anesthesia Plan    ASA Status:  1    NPO Status:  NPO Appropriate    Anesthesia Type: General.     - Airway: ETT   Induction: Intravenous, Propofol.   Maintenance: Balanced.        Consents    Anesthesia Plan(s) and associated risks, benefits, and realistic alternatives discussed. Questions answered and patient/representative(s) expressed understanding.     - Discussed:     - Discussed with:  Patient       Use of blood products discussed: No .     Postoperative Care    Pain management: Multi-modal analgesia, Oral pain medications.   PONV prophylaxis: Ondansetron (or other 5HT-3), Dexamethasone or Solumedrol     Comments:                Sarthak Huggins, DO  "

## 2023-11-03 ENCOUNTER — HOSPITAL ENCOUNTER (OUTPATIENT)
Facility: AMBULATORY SURGERY CENTER | Age: 23
Discharge: HOME | End: 2023-11-03
Attending: STUDENT IN AN ORGANIZED HEALTH CARE EDUCATION/TRAINING PROGRAM | Admitting: STUDENT IN AN ORGANIZED HEALTH CARE EDUCATION/TRAINING PROGRAM
Payer: COMMERCIAL

## 2023-11-03 ENCOUNTER — ANESTHESIA (OUTPATIENT)
Dept: SURGERY | Facility: AMBULATORY SURGERY CENTER | Age: 23
End: 2023-11-03
Payer: COMMERCIAL

## 2023-11-03 VITALS
TEMPERATURE: 98.2 F | DIASTOLIC BLOOD PRESSURE: 65 MMHG | OXYGEN SATURATION: 100 % | HEART RATE: 83 BPM | HEIGHT: 69 IN | BODY MASS INDEX: 20.73 KG/M2 | SYSTOLIC BLOOD PRESSURE: 105 MMHG | RESPIRATION RATE: 12 BRPM | WEIGHT: 140 LBS

## 2023-11-03 DIAGNOSIS — Z98.890 S/P LAPAROSCOPY: Primary | ICD-10-CM

## 2023-11-03 DIAGNOSIS — N84.0 ENDOMETRIAL POLYP: ICD-10-CM

## 2023-11-03 DIAGNOSIS — N92.0 MENORRHAGIA WITH REGULAR CYCLE: ICD-10-CM

## 2023-11-03 DIAGNOSIS — N94.6 MENSES PAINFUL: ICD-10-CM

## 2023-11-03 LAB
HCG UR QL: NEGATIVE
INTERNAL QC OK POCT: NORMAL
POCT KIT EXPIRATION DATE: NORMAL
POCT KIT LOT NUMBER: NORMAL

## 2023-11-03 PROCEDURE — 49320 DIAG LAPARO SEPARATE PROC: CPT | Mod: GC | Performed by: STUDENT IN AN ORGANIZED HEALTH CARE EDUCATION/TRAINING PROGRAM

## 2023-11-03 PROCEDURE — 58300 INSERT INTRAUTERINE DEVICE: CPT | Mod: GC | Performed by: STUDENT IN AN ORGANIZED HEALTH CARE EDUCATION/TRAINING PROGRAM

## 2023-11-03 PROCEDURE — 88305 TISSUE EXAM BY PATHOLOGIST: CPT | Mod: TC | Performed by: STUDENT IN AN ORGANIZED HEALTH CARE EDUCATION/TRAINING PROGRAM

## 2023-11-03 PROCEDURE — 81025 URINE PREGNANCY TEST: CPT | Performed by: PATHOLOGY

## 2023-11-03 PROCEDURE — 49320 DIAG LAPARO SEPARATE PROC: CPT

## 2023-11-03 PROCEDURE — 88305 TISSUE EXAM BY PATHOLOGIST: CPT | Mod: 26 | Performed by: PATHOLOGY

## 2023-11-03 PROCEDURE — 58558 HYSTEROSCOPY BIOPSY: CPT

## 2023-11-03 PROCEDURE — 58558 HYSTEROSCOPY BIOPSY: CPT | Mod: GC | Performed by: STUDENT IN AN ORGANIZED HEALTH CARE EDUCATION/TRAINING PROGRAM

## 2023-11-03 DEVICE — IMPLANTABLE DEVICE: Type: IMPLANTABLE DEVICE | Site: ABDOMEN | Status: FUNCTIONAL

## 2023-11-03 DEVICE — IUD CONTRACEPTIVE DEVICE MIRENA 50419-4230-01: Type: IMPLANTABLE DEVICE | Site: ABDOMEN | Status: FUNCTIONAL

## 2023-11-03 RX ORDER — FENTANYL CITRATE 50 UG/ML
INJECTION, SOLUTION INTRAMUSCULAR; INTRAVENOUS PRN
Status: DISCONTINUED | OUTPATIENT
Start: 2023-11-03 | End: 2023-11-03

## 2023-11-03 RX ORDER — FENTANYL CITRATE 50 UG/ML
25 INJECTION, SOLUTION INTRAMUSCULAR; INTRAVENOUS EVERY 5 MIN PRN
Status: DISCONTINUED | OUTPATIENT
Start: 2023-11-03 | End: 2023-11-03 | Stop reason: HOSPADM

## 2023-11-03 RX ORDER — ONDANSETRON 4 MG/1
4 TABLET, ORALLY DISINTEGRATING ORAL EVERY 30 MIN PRN
Status: DISCONTINUED | OUTPATIENT
Start: 2023-11-03 | End: 2023-11-04 | Stop reason: HOSPADM

## 2023-11-03 RX ORDER — HYDROMORPHONE HYDROCHLORIDE 1 MG/ML
0.4 INJECTION, SOLUTION INTRAMUSCULAR; INTRAVENOUS; SUBCUTANEOUS EVERY 5 MIN PRN
Status: DISCONTINUED | OUTPATIENT
Start: 2023-11-03 | End: 2023-11-03 | Stop reason: HOSPADM

## 2023-11-03 RX ORDER — DEXAMETHASONE SODIUM PHOSPHATE 4 MG/ML
INJECTION, SOLUTION INTRA-ARTICULAR; INTRALESIONAL; INTRAMUSCULAR; INTRAVENOUS; SOFT TISSUE PRN
Status: DISCONTINUED | OUTPATIENT
Start: 2023-11-03 | End: 2023-11-03

## 2023-11-03 RX ORDER — KETOROLAC TROMETHAMINE 30 MG/ML
INJECTION, SOLUTION INTRAMUSCULAR; INTRAVENOUS PRN
Status: DISCONTINUED | OUTPATIENT
Start: 2023-11-03 | End: 2023-11-03

## 2023-11-03 RX ORDER — ONDANSETRON 2 MG/ML
4 INJECTION INTRAMUSCULAR; INTRAVENOUS EVERY 30 MIN PRN
Status: DISCONTINUED | OUTPATIENT
Start: 2023-11-03 | End: 2023-11-04 | Stop reason: HOSPADM

## 2023-11-03 RX ORDER — ACETAMINOPHEN 325 MG/1
650 TABLET ORAL EVERY 6 HOURS PRN
Start: 2023-11-03

## 2023-11-03 RX ORDER — ONDANSETRON 2 MG/ML
4 INJECTION INTRAMUSCULAR; INTRAVENOUS EVERY 30 MIN PRN
Status: DISCONTINUED | OUTPATIENT
Start: 2023-11-03 | End: 2023-11-03 | Stop reason: HOSPADM

## 2023-11-03 RX ORDER — ACETAMINOPHEN 325 MG/1
975 TABLET ORAL ONCE
Status: COMPLETED | OUTPATIENT
Start: 2023-11-03 | End: 2023-11-03

## 2023-11-03 RX ORDER — OXYCODONE HYDROCHLORIDE 5 MG/1
5-10 TABLET ORAL EVERY 4 HOURS PRN
Qty: 6 TABLET | Refills: 0 | Status: SHIPPED | OUTPATIENT
Start: 2023-11-03 | End: 2024-09-25

## 2023-11-03 RX ORDER — ONDANSETRON 4 MG/1
4 TABLET, ORALLY DISINTEGRATING ORAL EVERY 30 MIN PRN
Status: DISCONTINUED | OUTPATIENT
Start: 2023-11-03 | End: 2023-11-03 | Stop reason: HOSPADM

## 2023-11-03 RX ORDER — HYDROMORPHONE HYDROCHLORIDE 1 MG/ML
0.2 INJECTION, SOLUTION INTRAMUSCULAR; INTRAVENOUS; SUBCUTANEOUS EVERY 5 MIN PRN
Status: DISCONTINUED | OUTPATIENT
Start: 2023-11-03 | End: 2023-11-03 | Stop reason: HOSPADM

## 2023-11-03 RX ORDER — ONDANSETRON 4 MG/1
4 TABLET, ORALLY DISINTEGRATING ORAL EVERY 8 HOURS PRN
Qty: 4 TABLET | Refills: 0 | Status: SHIPPED | OUTPATIENT
Start: 2023-11-03 | End: 2024-09-25

## 2023-11-03 RX ORDER — OXYCODONE HYDROCHLORIDE 5 MG/1
5 TABLET ORAL
Status: COMPLETED | OUTPATIENT
Start: 2023-11-03 | End: 2023-11-03

## 2023-11-03 RX ORDER — LIDOCAINE HYDROCHLORIDE 20 MG/ML
INJECTION, SOLUTION INFILTRATION; PERINEURAL PRN
Status: DISCONTINUED | OUTPATIENT
Start: 2023-11-03 | End: 2023-11-03

## 2023-11-03 RX ORDER — SODIUM CHLORIDE, SODIUM LACTATE, POTASSIUM CHLORIDE, CALCIUM CHLORIDE 600; 310; 30; 20 MG/100ML; MG/100ML; MG/100ML; MG/100ML
INJECTION, SOLUTION INTRAVENOUS CONTINUOUS
Status: DISCONTINUED | OUTPATIENT
Start: 2023-11-03 | End: 2023-11-03 | Stop reason: HOSPADM

## 2023-11-03 RX ORDER — PROPOFOL 10 MG/ML
INJECTION, EMULSION INTRAVENOUS CONTINUOUS PRN
Status: DISCONTINUED | OUTPATIENT
Start: 2023-11-03 | End: 2023-11-03

## 2023-11-03 RX ORDER — FENTANYL CITRATE 50 UG/ML
50 INJECTION, SOLUTION INTRAMUSCULAR; INTRAVENOUS EVERY 5 MIN PRN
Status: DISCONTINUED | OUTPATIENT
Start: 2023-11-03 | End: 2023-11-03 | Stop reason: HOSPADM

## 2023-11-03 RX ORDER — ONDANSETRON 2 MG/ML
INJECTION INTRAMUSCULAR; INTRAVENOUS PRN
Status: DISCONTINUED | OUTPATIENT
Start: 2023-11-03 | End: 2023-11-03

## 2023-11-03 RX ORDER — PROPOFOL 10 MG/ML
INJECTION, EMULSION INTRAVENOUS PRN
Status: DISCONTINUED | OUTPATIENT
Start: 2023-11-03 | End: 2023-11-03

## 2023-11-03 RX ORDER — BUPIVACAINE HYDROCHLORIDE AND EPINEPHRINE 2.5; 5 MG/ML; UG/ML
INJECTION, SOLUTION INFILTRATION; PERINEURAL PRN
Status: DISCONTINUED | OUTPATIENT
Start: 2023-11-03 | End: 2023-11-03 | Stop reason: HOSPADM

## 2023-11-03 RX ORDER — OXYCODONE HYDROCHLORIDE 5 MG/1
10 TABLET ORAL
Status: DISCONTINUED | OUTPATIENT
Start: 2023-11-03 | End: 2023-11-04 | Stop reason: HOSPADM

## 2023-11-03 RX ORDER — ACETAMINOPHEN 325 MG/1
975 TABLET ORAL ONCE
Status: DISCONTINUED | OUTPATIENT
Start: 2023-11-03 | End: 2023-11-03 | Stop reason: HOSPADM

## 2023-11-03 RX ORDER — IBUPROFEN 600 MG/1
600 TABLET, FILM COATED ORAL EVERY 6 HOURS PRN
Start: 2023-11-03

## 2023-11-03 RX ORDER — LIDOCAINE 40 MG/G
CREAM TOPICAL
Status: DISCONTINUED | OUTPATIENT
Start: 2023-11-03 | End: 2023-11-03 | Stop reason: HOSPADM

## 2023-11-03 RX ORDER — ACETAMINOPHEN 325 MG/1
975 TABLET ORAL ONCE
Status: DISCONTINUED | OUTPATIENT
Start: 2023-11-03 | End: 2023-11-04 | Stop reason: HOSPADM

## 2023-11-03 RX ORDER — IBUPROFEN 200 MG
800 TABLET ORAL ONCE
Status: DISCONTINUED | OUTPATIENT
Start: 2023-11-03 | End: 2023-11-04 | Stop reason: HOSPADM

## 2023-11-03 RX ADMIN — DEXAMETHASONE SODIUM PHOSPHATE 4 MG: 4 INJECTION, SOLUTION INTRA-ARTICULAR; INTRALESIONAL; INTRAMUSCULAR; INTRAVENOUS; SOFT TISSUE at 09:10

## 2023-11-03 RX ADMIN — Medication 0.5 MG: at 09:35

## 2023-11-03 RX ADMIN — PROPOFOL 150 MCG/KG/MIN: 10 INJECTION, EMULSION INTRAVENOUS at 09:10

## 2023-11-03 RX ADMIN — SODIUM CHLORIDE, SODIUM LACTATE, POTASSIUM CHLORIDE, CALCIUM CHLORIDE: 600; 310; 30; 20 INJECTION, SOLUTION INTRAVENOUS at 09:04

## 2023-11-03 RX ADMIN — PROPOFOL 200 MG: 10 INJECTION, EMULSION INTRAVENOUS at 09:10

## 2023-11-03 RX ADMIN — KETOROLAC TROMETHAMINE 30 MG: 30 INJECTION, SOLUTION INTRAMUSCULAR; INTRAVENOUS at 11:07

## 2023-11-03 RX ADMIN — ONDANSETRON 4 MG: 2 INJECTION INTRAMUSCULAR; INTRAVENOUS at 09:25

## 2023-11-03 RX ADMIN — OXYCODONE HYDROCHLORIDE 5 MG: 5 TABLET ORAL at 13:00

## 2023-11-03 RX ADMIN — LIDOCAINE HYDROCHLORIDE 100 MG: 20 INJECTION, SOLUTION INFILTRATION; PERINEURAL at 09:10

## 2023-11-03 RX ADMIN — SODIUM CHLORIDE, SODIUM LACTATE, POTASSIUM CHLORIDE, CALCIUM CHLORIDE: 600; 310; 30; 20 INJECTION, SOLUTION INTRAVENOUS at 07:46

## 2023-11-03 RX ADMIN — Medication 40 MG: at 09:10

## 2023-11-03 RX ADMIN — OXYCODONE HYDROCHLORIDE 5 MG: 5 TABLET ORAL at 11:56

## 2023-11-03 RX ADMIN — Medication 10 MG: at 09:28

## 2023-11-03 RX ADMIN — ACETAMINOPHEN 975 MG: 325 TABLET ORAL at 07:44

## 2023-11-03 RX ADMIN — FENTANYL CITRATE 50 MCG: 50 INJECTION, SOLUTION INTRAMUSCULAR; INTRAVENOUS at 09:10

## 2023-11-03 RX ADMIN — FENTANYL CITRATE 50 MCG: 50 INJECTION, SOLUTION INTRAMUSCULAR; INTRAVENOUS at 09:16

## 2023-11-03 NOTE — ANESTHESIA POSTPROCEDURE EVALUATION
Patient: Kaelyn Munoz    Procedure: Procedure(s):  diagnostic laparoscopy,excision of endometriosis; hysteroscopy with morcellation of endometrial polyp, mirena iud  Dilation and curettage, operative hysteroscopy with morcellator and Mirena IUD placement       Anesthesia Type:  General    Note:  Disposition: Outpatient   Postop Pain Control: Uneventful            Sign Out: Well controlled pain   PONV: No   Neuro/Psych: Uneventful            Sign Out: Acceptable/Baseline neuro status   Airway/Respiratory: Uneventful            Sign Out: Acceptable/Baseline resp. status   CV/Hemodynamics: Uneventful            Sign Out: Acceptable CV status; No obvious hypovolemia; No obvious fluid overload   Other NRE: NONE   DID A NON-ROUTINE EVENT OCCUR? No           Last vitals:  Vitals Value Taken Time   /82 11/03/23 1200   Temp 36.8  C (98.3  F) 11/03/23 1200   Pulse 83 11/03/23 1200   Resp 13 11/03/23 1200   SpO2 100 % 11/03/23 1200       Electronically Signed By: Sarthak Huggins DO  November 3, 2023  1:10 PM

## 2023-11-03 NOTE — DISCHARGE INSTRUCTIONS
[facility]  Home Care Following Anesthesia  For 24 hours after surgery:  Get plenty of rest.  A responsible adult must stay with you for at least 24 hours after you leave the surgery center.  Do not drive or use heavy equipment.  If you have weakness or tingling, don't drive or use heavy equipment until this feeling goes away.   Do not drink alcohol.   Avoid strenuous or risky activities.  Ask for help when climbing stairs.  You may feel lightheaded.  IF so, sit for a few minutes before standing.  Have someone help you get up.   If you have nausea (feel sick to your stomach): Drink only clear liquids such as apple juice, ginger ale, broth or 7-Up.  Rest may also help.  Be sure to drink enough fluids.  Move to a regular diet as you feel able.   You may have a slight fever.  Call the doctor if your fever is over 100 F (37.7 C) (taken under the tongue) or lasts longer than 24 hours.  You may have a dry mouth, a sore throat, muscle aches or trouble sleeping. These should go away after 24 hours.  Do not make important or legal decisions.   It is recommended to avoid smoking.               Tips for taking pain medications  To get the best pain relief possible, remember these points:  Take pain medications as directed, before pain becomes severe.  Pain medication can upset your stomach: taking it with food may help.  Constipation is a common side effect of pain medication. Drink plenty of  fluids.  Eat foods high in fiber. Take a stool softener if recommended by your doctor or pharmacist.  Do not drink alcohol, drive or operate machinery while taking pain medications.  Ask about other ways to control pain, such as with heat, ice or relaxation.    Tylenol/Acetaminophen Consumption    If you feel your pain relief is insufficient, you may take Tylenol/Acetaminophen in addition to your narcotic pain medication.   Be careful not to exceed 4,000 mg of Tylenol/Acetaminophen in a 24 hour  period from all sources.  If you are taking extra strength Tylenol/acetaminophen (500 mg), the maximum dose is 8 tablets in 24 hours.  If you are taking regular strength acetaminophen (325 mg), the maximum dose is 12 tablets in 24 hours.  Received 975 mg of Tylenol at 7:45 AM today. Next dose is available at 1:45 PM as needed per package instruction.  You received Toradol at 11:07 AM today. Avoid NSAID's before 5:07 PM as needed per package instruction.    Call a doctor for any of the following:  Signs of infection (fever, growing tenderness at the surgery site, a large amount of drainage or bleeding, severe pain, foul-smelling drainage, redness, swelling).  It has been over 8 to 10 hours since surgery and you are still not able to urinate (pass water).  Headache for over 24 hours.  Numbness, tingling or weakness the day after surgery (if you had spinal anesthesia).  Signs of Covid-19 infection (temperature over 100 degrees, shortness of breath, cough, loss of taste/smell, generalized body aches, persistent headache, chills, sore throat, nausea/vomiting/diarrhea)  Your doctor is:       Dr. Yasmin Moore, OB/GYN: [phone]               Or dial [phone] and ask for the resident on call for:  OB/GYN  For emergency care, call the:  [facility] Emergency Department: [phone] (TTY for hearing impaired: [phone])                                                                                 [facility] [place]  [address]  [address]  [facility] [id number] [phone]    11/3/2023    Kaelyn M Alexander  [address]  [facility] [address]  [phone] (home)     :  2000    To Whom it May Concern:    Kaelyn M Alexander missed work on 11/3/203 due surgery. May return to work in 2 week(s) with the following restrictions: no lifting >15 lbs until 23.  No lifting over 15 pounds and no  strenuous physical activity for 4 weeks.     Please contact me for any questions or concerns.    Sincerely,  Dr.Meredith Moore      ***

## 2023-11-03 NOTE — BRIEF OP NOTE
[facility]    Brief Operative Note    Pre-operative diagnosis: Menorrhagia with regular cycle [N92.0]  Endometrial polyp [N84.0]  Menses painful [N94.6]  Post-operative diagnosis Same as pre-operative diagnosis, stage I endometriosis    Procedure: diagnostic laparoscopy,excision of endometriosis; hysteroscopy with morcellation of endometrial polyp, mirena iud, N/A - Abdomen  Dilation and curettage, operative hysteroscopy with morcellator and Mirena IUD placement, N/A - Abdomen    Surgeon: Surgeon(s) and Role:     * Yasmin Moore MD - Primary     * Jenaro Bronson MD - Assisting  Anesthesia: General   Estimated Blood Loss: 5 mL  IVF: 1000 mL crystalloid  UOp: 25 mL    Drains: None  Specimens:   ID Type Source Tests Collected by Time Destination   1 : Right Uteral Sacral Peritoneal Biopsy Tissue Peritoneum SURGICAL PATHOLOGY EXAM Yasmin Moore MD 11/3/2023 10:23 AM    2 : Left Uteral Sacral Peritoneal Biopsy Tissue Peritoneum SURGICAL PATHOLOGY EXAM Yasmin Moore MD 11/3/2023 10:26 AM    3 : Endometrial Polyp and Curettings Tissue Endometrium SURGICAL PATHOLOGY EXAM Yasimn Moore MD 11/3/2023 10:59 AM      Findings:     Exam under anesthesia revealed healthy external genitalia, vagina and cervix. Retroverted and mobile uterus with no palpable adnexal lesions Laparoscopic survey of the abdomen and pelvis noted normal appearing liver, liver edge was smooth and regular, normal apearing, diaphragm, colon, and appendix with no notable endometriotic implants. Small large bowel adhesions to the peritoneum bilaterally, R more vascularized than left. The uterus was hyperemic with an adenomyosis-like appearance. Small physiologic paratubal cyst on the right fallopian tube, with grossly-normal appearing fallopian tube on the left. Both tubes and fimbrae are mobile. Ovaries grossly normal. No gunpowder or clear lesions in the anterior cul-de-sac. Posterior cul-de-sac  and uterosacral ligments have scattered red and clear lesions suggestive of endometriosis.    Hysteroscopy showed a small uterine polyp on the anterior wall close to the internal cervical os, otherwise no fibroids and normal uterine cavity. Normal tubal ostia visualized bilaterally.     Complications: None.  Implants:   Implant Name Type Inv. Item Serial No.  Lot No. LRB No. Used Action   [place] [employer] Laparoscopic Sealer/Divider   [id number] N/A 1 Used as a Supply   IUD CONTRACEPTIVE DEVICE MIRENA [id number]  [id number] Contraceptive Device IUD CONTRACEPTIVE DEVICE MIRENA [id number]  [facility] [id number] N/A 1 Implanted         Jenaro Bronson MD  Obstetrics and Gynecology, PGY-1  11/03/23 11:30 AM

## 2023-11-03 NOTE — ANESTHESIA CARE TRANSFER NOTE
Patient: Kaelyn Munoz    Procedure: Procedure(s):  diagnostic laparoscopy,excision of endometriosis; hysteroscopy with morcellation of endometrial polyp, mirena iud  Dilation and curettage, operative hysteroscopy with morcellator and Mirena IUD placement       Diagnosis: Menorrhagia with regular cycle [N92.0]  Endometrial polyp [N84.0]  Menses painful [N94.6]  Diagnosis Additional Information: No value filed.    Anesthesia Type:   General     Note:    Oropharynx: oropharynx clear of all foreign objects  Level of Consciousness: awake  Oxygen Supplementation: face mask    Independent Airway: airway patency satisfactory and stable  Dentition: dentition unchanged  Vital Signs Stable: post-procedure vital signs reviewed and stable    Patient transferred to: PACU    Handoff Report: Identifed the Patient, Identified the Reponsible Provider, Reviewed the pertinent medical history, Discussed the surgical course, Reviewed Intra-OP anesthesia mangement and issues during anesthesia, Set expectations for post-procedure period and Allowed opportunity for questions and acknowledgement of understanding      Vitals:  Vitals Value Taken Time   BP     Temp     Pulse     Resp 22 11/03/23 1134   SpO2 100 % 11/03/23 1134   Vitals shown include unfiled device data.    Electronically Signed By: JOSEFINA Cuevas CRNA  November 3, 2023  11:35 AM

## 2023-11-03 NOTE — OP NOTE
[facility] [place]  Operative Note      Name: Kaelyn Munoz  MRN: [id number]  : 2000  Date of surgery: 11/3/2023    Preoperative diagnosis:  - Menorrhagia w/ regular cycle, endometrial polyp, painful menses    Postoperative diagnosis:  - Same as preoperative diagnosis  - Visual Stage I endometriosis    Procedure(s):  Diagnostic laparoscopy, hysteroscopy D&C, mIUD insertion    Surgeon: Dr. Yasmin Moore MD  Assistants: Jenaro Bronson MD, PGY-1    Anesthesia: General  EBL: 5 mL  Urine output: 25 mL  IV fluids: 1000 mL crystalloid  Fluid Deficit: 285 mL    Specimens:   ID Type Source Tests Collected by Time Destination   1 : Right Uteral Sacral Peritoneal Biopsy Tissue Peritoneum SURGICAL PATHOLOGY EXAM Yasmin Moore MD 11/3/2023 10:23 AM    2 : Left Uteral Sacral Peritoneal Biopsy Tissue Peritoneum SURGICAL PATHOLOGY EXAM Yasmin Moore MD 11/3/2023 10:26 AM    3 : Endometrial Polyp and Curettings Tissue Endometrium SURGICAL PATHOLOGY EXAM Yasmin Moore MD 11/3/2023 10:59 AM      Complications: None apparent    Findings:   - Exam under anesthesia revealed healthy external genitalia, vagina and cervix.   - Midposition and mobile uterus with no palpable adnexal lesions   - Laparoscopic survey of the abdomen and pelvis noted normal appearing liver, liver edge was smooth and regular, normal apearing, diaphragm, colon, and appendix with no notable endometriotic implants.  - Small large bowel adhesions to the peritoneum bilaterally, R more vascularized than left.   - The uterus was hyperemic and somewhat globular, suggestive of possible adenomyosis  - Small physiologic paratubal cyst on the right fallopian tube, with grossly-normal appearing fallopian tube on the left. Both tubes and fimbrae were mobile.   - Ovaries grossly normal with small functional cyston left ovary   - No gunpowder or clear lesions in the anterior cul-de-sac. Posterior cul-de-sac and uterosacral ligments have  scattered red and clear lesions suggestive of endometriosis.  - Hysteroscopy showed a small uterine polyp on the anterior wall close to the internal cervical os, otherwise no fibroids and normal uterine cavity.   - Normal tubal ostia visualized bilaterally.     Indications: The patient is a 23 year old  who initially presented with menorrhagia, painful menses, and an endometrial polyp. The risks and benefits of procedure were reviewed with patient, all questions were answered and written informed consent was obtained.     Procedure in detail:   The patient was taken to the operating room where general anesthesia was administered and found adequate. An EUA was performed with the findings listed above. The patient was was prepped and draped in usual sterile manner in dorsal lithotomy position with yellow-fin stirrups and tucked arms. A time out was performed. SCDs were in place for VTE prophylaxis.    A medium Graves speculum was inserted into the vagina and the anterior lip of the cervix was grasped with a single-toothed tenaculum. An acorn uterine manipulator was then placed. Attention was then turned to the abdomen where 0.25% Marcaine was used to infiltrate the superior aspect of the umbilicus. An 11-blade scalpel was used to make a 5 mm vertical incision in the umbilicus. A Veress needle was placed with saline noted to flow freely through the attached syringe. The CO2 gas was attached with an opening pressure of 4. The abdomen was insufflated to a pressure of 15 mmHg. The Veress needle was removed and a 5 mm port was placed in the umbilical incision under direct visualization. A 5 mm port was placed in the left lower quadrant under direct visualization after infiltration with 0.25% Marcaine. The abdomen and pelvis were explored with the above noted findings. An additional port was placed in the right lower quadrant under direct visualization. The ureters were identified transperitoneally, and samples of  suspected endometriosis were carefully sampled from peritoneum overlying the right and left uterosacral ligaments to send to pathology for analysis using a [facility] ligisure to collect the samples. The surgical sites were inspected and hemostasis was observed. The abdomen was desufflated and the ports were removed.    The acorn manipulator was removed from the uterus. The endocervical canal was dilated to 19 Fr using Darling dilators. The hysteroscope was inserted without difficulty with the above findings noted. The Myosure was used to morcellate the anterior polyp. The hysteroscope was removed, and attention was then shifted to mIUD placement. The uterus sounded to 7.5 cm. The Mirena IUD insertion apparatus was prepared and placed through the cervix without significant resistance, and deployed at the fundus in the usual fashion. The strings were trimmed 3 cm from the external os. The tenaculum was removed from the anterior lip of the cervix, and all sites were made hemostatic with pressure. The speculum was then removed.    IUD information:  Implant Name Type Inv. Item Serial No.  Lot No. LRB No. Used Action   IUD CONTRACEPTIVE DEVICE MIRENA [id number]  [id number] Contraceptive Device IUD CONTRACEPTIVE DEVICE MIRENA [id number]  [facility] [id number] N/A 1 Implanted           The skin of the port sites was then closed with 4-0 monocryl and dermabond.    Sponge and needle counts were correct x2. The patient tolerated the procedure well and was taken to the recovery area in stable condition.     Dr. Moore was present and scrubbed for the procedure.    Jenaro Bronson MD  Obstetrics and Gynecology, PGY-1  11/03/23     I was present and scrubbed for the entire procedure.   Yasmin Moore MD,  11/10/23 3:39 PM

## 2023-11-05 DIAGNOSIS — Z98.890 STATUS POST LAPAROSCOPY: Primary | ICD-10-CM

## 2023-11-05 RX ORDER — OXYCODONE HYDROCHLORIDE 5 MG/1
5 TABLET ORAL EVERY 6 HOURS PRN
Qty: 6 TABLET | Refills: 0 | Status: SHIPPED | OUTPATIENT
Start: 2023-11-05 | End: 2023-11-08

## 2023-11-06 LAB
PATH REPORT.COMMENTS IMP SPEC: NORMAL
PATH REPORT.COMMENTS IMP SPEC: NORMAL
PATH REPORT.FINAL DX SPEC: NORMAL
PATH REPORT.GROSS SPEC: NORMAL
PATH REPORT.MICROSCOPIC SPEC OTHER STN: NORMAL
PATH REPORT.RELEVANT HX SPEC: NORMAL
PHOTO IMAGE: NORMAL

## 2023-11-09 ENCOUNTER — NURSE TRIAGE (OUTPATIENT)
Dept: OBGYN | Facility: CLINIC | Age: 23
End: 2023-11-09
Payer: COMMERCIAL

## 2023-11-09 ENCOUNTER — OFFICE VISIT (OUTPATIENT)
Dept: OBGYN | Facility: CLINIC | Age: 23
End: 2023-11-09
Attending: OBSTETRICS & GYNECOLOGY
Payer: COMMERCIAL

## 2023-11-09 VITALS
HEART RATE: 93 BPM | DIASTOLIC BLOOD PRESSURE: 78 MMHG | SYSTOLIC BLOOD PRESSURE: 114 MMHG | WEIGHT: 138.7 LBS | HEIGHT: 69 IN | BODY MASS INDEX: 20.54 KG/M2

## 2023-11-09 DIAGNOSIS — Z98.890 S/P LAPAROSCOPY: ICD-10-CM

## 2023-11-09 DIAGNOSIS — Z98.890 STATUS POST LAPAROSCOPY: Primary | ICD-10-CM

## 2023-11-09 PROCEDURE — 99213 OFFICE O/P EST LOW 20 MIN: CPT | Performed by: OBSTETRICS & GYNECOLOGY

## 2023-11-09 PROCEDURE — 99024 POSTOP FOLLOW-UP VISIT: CPT | Performed by: OBSTETRICS & GYNECOLOGY

## 2023-11-09 RX ORDER — OXYCODONE HYDROCHLORIDE 5 MG/1
5-10 TABLET ORAL EVERY 4 HOURS PRN
Qty: 6 TABLET | Refills: 0 | Status: CANCELLED
Start: 2023-11-09

## 2023-11-09 NOTE — TELEPHONE ENCOUNTER
Called patient with update from MD. Patient agreeable to an appointment with Dr. Pickett this AM at 10:30am.

## 2023-11-09 NOTE — PROGRESS NOTES
[facility]  Follow-Up Visit    S: Kaelyn Munoz is a 23 year old  here to discuss post-operative pain following diagnostic laparoscopy with excision of endometriosis; hysteroscopy with morcellation of endometrial polyp, and mirena iud placement. Post-operative pain has been moderately controlled with NSAIDs, tylenol, and roxicodone. Patient reports pain as being 5-6/10 before use of analgesics and improvement to 1 or 2/10 with use of combination of both types. Recent refill of narcotic 23. Reports less symptom improvement with one medication alone. Patient denies fever, chills, headaches, sob, chest pain, or dysuria. Reports mild hematuria.       O:  LMP 2023   Gen: Well-appearing, NAD  HEENT: Normocephalic, atraumatic  CV:        Well perfused; no LE edema  Pulm:  Normal respiratory effort and rate  Abd: Soft, non-tender, non-distended, incisions healing well, no rebound or tenderness on exam.      A/P:  Kaelyn Munoz is a 23 year old  here to discuss post-operative pain following diagnostic laparoscopy with excision of endometriosis; hysteroscopy with morcellation of endometrial polyp, and mirena iud placement. Patient doing well with moderate control of pain. Incision healing well, abdominal exam benign. Recent refill of Roxicodone on 23.    Plan:  - Increase Ibuprofen dose 600 mg q6 PRN with food  - Acetaminophen 500 mg Q6  - Discussed alternative adjunct medications including flexeril and gabapentin.  Reviewed exam findings do not suggest need for ongoing narcotic pain medication and risk of reliance.  Discussed pain will gradually resolve on its own.  Patient did not want to have to  any medications at the pharmacy and plans to continue with tylenol and ibuprofen alone.    Staffed with Dr. Piyush BARRERA, Melba Pickett, was present with the medical student who participated in the service and in the documentation of the note. I have verified the history and personally  performed the physical exam and medical decision making. I agree with the assessment and plan of care as documented in the note.     Melba Pickett MD

## 2023-11-09 NOTE — LETTER
2023       RE: Kaelyn Munoz  [address] [facility] [address]     Dear Colleague,    Thank you for referring your patient, Kaelyn Munoz, to the [facility] [place] at [facility]. Please see a copy of my visit note below.    [facility]  Follow-Up Visit    S: Kaelyn Munoz is a 23 year old  here to discuss post-operative pain following diagnostic laparoscopy with excision of endometriosis; hysteroscopy with morcellation of endometrial polyp, and mirena iud placement. Post-operative pain has been moderately controlled with NSAIDs, tylenol, and roxicodone. Patient reports pain as being 5-6/10 before use of analgesics and improvement to 1 or 2/10 with use of combination of both types. Recent refill of narcotic 23. Reports less symptom improvement with one medication alone. Patient denies fever, chills, headaches, sob, chest pain, or dysuria. Reports mild hematuria.       O:  LMP 2023   Gen: Well-appearing, NAD  HEENT: Normocephalic, atraumatic  CV:        Well perfused; no LE edema  Pulm:  Normal respiratory effort and rate  Abd: Soft, non-tender, non-distended, incisions healing well, no rebound or tenderness on exam.      A/P:  Kaelyn Munoz is a 23 year old  here to discuss post-operative pain following diagnostic laparoscopy with excision of endometriosis; hysteroscopy with morcellation of endometrial polyp, and mirena iud placement. Patient doing well with moderate control of pain. Incision healing well, abdominal exam benign. Recent refill of Roxicodone on 23.    Plan:  - Increase Ibuprofen dose 600 mg q6 PRN with food  - Acetaminophen 500 mg Q6  - Discussed alternative adjunct medications including flexeril and gabapentin.  Reviewed exam findings do not suggest need for ongoing narcotic pain medication and risk of reliance.  Discussed pain will gradually resolve on its own.   Patient did not want to have to  any medications at the pharmacy and plans to continue with tylenol and ibuprofen alone.    Staffed with Dr. Piyush BARRERA, Melba Pickett, was present with the medical student who participated in the service and in the documentation of the note. I have verified the history and personally performed the physical exam and medical decision making. I agree with the assessment and plan of care as documented in the note.     Melba Pickett MD

## 2023-11-09 NOTE — PATIENT INSTRUCTIONS
Thank you for trusting us with your care!     If you need to contact us for questions about:  Symptoms, Scheduling & Medical Questions; Non-urgent (2-3 day response) Virgil message, Urgent (needing response today) [phone] (if after 3:30pm next day response)   Prescriptions: Please call your Pharmacy   Billing: Jeff [phone] or [facility]:[phone]

## 2023-11-09 NOTE — TELEPHONE ENCOUNTER
Connected with patient via call center to review symptoms. Patient is requesting a refill of Oxycodone for pain following surgery on 11/03 (diagnostic laparoscopy,excision of endometriosis; hysteroscopy with morcellation of endometrial polyp, mirena iud (Abdomen)   Dilation and curettage, operative hysteroscopy with morcellator and Mirena IUD placement (Abdomen).     Patient has been taking Tylenol and Advil on rotation, which helps reduce pain from a 4-6/10 to a 4/10. Patient finished first Rx of Oxycodone and this helped pain reduce to a 1-2/10. Patient has also tried cold and heat therapy with little relief. Patient denies concerns with incisions and does not feel like this pain is related to gas pain.     Patient is requested additional refill of Oxycodone.

## 2023-11-15 ENCOUNTER — TELEPHONE (OUTPATIENT)
Dept: OBGYN | Facility: CLINIC | Age: 23
End: 2023-11-15
Payer: COMMERCIAL

## 2023-11-15 NOTE — TELEPHONE ENCOUNTER
Pt calls regarding her continued vaginal bleeding post laparoscopy/excision of endometriosis/hysteroscopy/IUD insertion on 11/3. She is changing (not saturating) 3-4 pads a day, similar to a menstrual period. No S&S of acute blood loss.  Still taking ibuprofen for pain, but notes improvement.    Discussed that she may experiencing bleeding for a couple of weeks post-procedure and spotting for 3-6 months post-iud insertion. Pt will monitor bleeding, call us on Friday with an update (also put in staff message reminder).    Described signs&symptoms of acute blood loss and when to seek emergency care. Pt verbalized understanding.

## 2023-11-17 PROBLEM — N80.9 ENDOMETRIOSIS: Status: ACTIVE | Noted: 2023-11-17

## 2023-11-17 NOTE — TELEPHONE ENCOUNTER
Kaelyn is still experiencing bleeding, but it has decreased (changing ~2 pads per day). Pt will continue to monitor, and if she's still bleeding in a week she will reach out to make an appointment.

## 2023-12-18 ENCOUNTER — TELEPHONE (OUTPATIENT)
Dept: OBGYN | Facility: CLINIC | Age: 23
End: 2023-12-18
Payer: COMMERCIAL

## 2023-12-18 NOTE — TELEPHONE ENCOUNTER
Patient calling because she has had continuous bleeding since IUD insertion/hysteroscopy on 11/3/23.  Reports more than spotting, changing 2 pads per day.      She will come for US and office visit to discuss options.  She is hoping to get additional hormone rx to stop bleeding.

## 2023-12-23 ENCOUNTER — HEALTH MAINTENANCE LETTER (OUTPATIENT)
Age: 23
End: 2023-12-23

## 2023-12-26 DIAGNOSIS — N93.9 ABNORMAL VAGINAL BLEEDING: Primary | ICD-10-CM

## 2023-12-27 ENCOUNTER — ANCILLARY PROCEDURE (OUTPATIENT)
Dept: ULTRASOUND IMAGING | Facility: CLINIC | Age: 23
End: 2023-12-27
Attending: OBSTETRICS & GYNECOLOGY
Payer: COMMERCIAL

## 2023-12-27 ENCOUNTER — OFFICE VISIT (OUTPATIENT)
Dept: OBGYN | Facility: CLINIC | Age: 23
End: 2023-12-27
Attending: MIDWIFE
Payer: COMMERCIAL

## 2023-12-27 VITALS
DIASTOLIC BLOOD PRESSURE: 75 MMHG | WEIGHT: 140.9 LBS | SYSTOLIC BLOOD PRESSURE: 109 MMHG | BODY MASS INDEX: 20.87 KG/M2 | HEART RATE: 64 BPM | HEIGHT: 69 IN

## 2023-12-27 DIAGNOSIS — Z97.5 IUD (INTRAUTERINE DEVICE) IN PLACE: ICD-10-CM

## 2023-12-27 DIAGNOSIS — N83.201 CYST OF RIGHT OVARY: ICD-10-CM

## 2023-12-27 DIAGNOSIS — N93.9 ABNORMAL VAGINAL BLEEDING: ICD-10-CM

## 2023-12-27 DIAGNOSIS — N93.9 VAGINAL BLEEDING: Primary | ICD-10-CM

## 2023-12-27 LAB — HGB BLD-MCNC: 10.2 G/DL

## 2023-12-27 PROCEDURE — 76856 US EXAM PELVIC COMPLETE: CPT | Mod: 26 | Performed by: OBSTETRICS & GYNECOLOGY

## 2023-12-27 PROCEDURE — 76856 US EXAM PELVIC COMPLETE: CPT

## 2023-12-27 PROCEDURE — 99214 OFFICE O/P EST MOD 30 MIN: CPT | Performed by: MIDWIFE

## 2023-12-27 PROCEDURE — 99213 OFFICE O/P EST LOW 20 MIN: CPT | Performed by: MIDWIFE

## 2023-12-27 PROCEDURE — 85018 HEMOGLOBIN: CPT | Performed by: MIDWIFE

## 2023-12-27 NOTE — LETTER
"12/27/2023       RE: Kaelyn Munoz  [address] [place] [address]     Dear Colleague,    Thank you for referring your patient, Kaelyn Munoz, to the [facility] [place] at [facility]. Please see a copy of my visit note below.    Chief Complaint: Continued pain post hysteroscopy D&C/laparoscopy, and Mirena IUD insertion on 11/3/23    SUBJECTIVE: Kaelyn Munoz 23 year old female presents with vaginal bleeding symptoms for 8 weeks, since procedure and Mirena insertion.     Since 11/3 pt has experienced daily bleeding requiring twice daily pad change. Once in November and again 12/4 Kaelyn had increased bleeding for 5 days that felt like a menses. Kaelyn was taking acetaminophen and ibuprofen regularly due to consistent pain which resolved around the last week of November.     Kaelyn denies any new sexual partners or concerns for STIs and declines screening today.     12/27/23 Gynecological Ultrasonography:   Uterus: anteverted. Contour is smooth/regular.  Size: 8.2 x 4.8 x 3.9 cm  Endometrium: Thickness Total 2.9 mm. IUD in situ  Findings:   Right Ovary: 4.0 x 3.3 x 2.8 cm. Complex cyst 2.7 x 2.1 x 2.6 cm  Left Ovary: 2.8 x 2.0 x 1.3 cm. Wnl  Cul de Sac Free Fluid: No free fluid  Technique: Transabdominal Imaging performed     Impression: IUD in appropriate position, thin endometrium. No obvious sonographic cause of abnormal bleeding. Cyst on right ovary, not well visualized transabdominally. Consider follow up in 3 months.       General medical, surgical, OB/Gyn and social histories   reviewed and updated in Histories section of [facility].     OBJECTIVE:  /75   Pulse 64   Ht 1.748 m (5' 8.8\")   Wt 63.9 kg (140 lb 14.4 oz)   BMI 20.93 kg/m    BMI: Body mass index is 20.93 kg/m .  GENERAL APPEARANCE: healthy, alert, and no distress  PSYCH: mentation appears normal and affect normal/bright  ABDOMEN: Soft without " masses or tenderness.  No CVA tenderness.   PELVIC EXAM: deferred     Hgb 10.2    ASSESSMENT/PLAN:  1. Vaginal bleeding  - Reviewed that bleeding for 3-6 months is considered normal with new LNG IUD. Discussed options to help include high dose NSAIDs or combined oral contraceptive pills. Pt is not interested in additional hormonal pills. Will plan 800mg ibuprofen TID with food.   - Hemoglobin POCT 10.2. Pt has history of BA and alpha thalassemia. Discussed ways to increase iron in the diet and gave handout regarding this.     2. IUD (intrauterine device) in place  - US reassuring    3. Cyst of right ovary  - Follow up with repeat imaging in 3 months    32 minutes spent on the date of the encounter doing chart review, history and exam, documentation and further activities per the note.    JOSEFINA Uriarte, CNM

## 2023-12-27 NOTE — PROGRESS NOTES
"Chief Complaint: Continued pain post hysteroscopy D&C/laparoscopy, and Mirena IUD insertion on 11/3/23    SUBJECTIVE: Kaelyn Munoz 23 year old female presents with vaginal bleeding symptoms for 8 weeks, since procedure and Mirena insertion.     Since 11/3 pt has experienced daily bleeding requiring twice daily pad change. Once in November and again 12/4 Kaelyn had increased bleeding for 5 days that felt like a menses. Kaelyn was taking acetaminophen and ibuprofen regularly due to consistent pain which resolved around the last week of November.     Kaelyn denies any new sexual partners or concerns for STIs and declines screening today.     12/27/23 Gynecological Ultrasonography:   Uterus: anteverted. Contour is smooth/regular.  Size: 8.2 x 4.8 x 3.9 cm  Endometrium: Thickness Total 2.9 mm. IUD in situ  Findings:   Right Ovary: 4.0 x 3.3 x 2.8 cm. Complex cyst 2.7 x 2.1 x 2.6 cm  Left Ovary: 2.8 x 2.0 x 1.3 cm. Wnl  Cul de Sac Free Fluid: No free fluid  Technique: Transabdominal Imaging performed     Impression: IUD in appropriate position, thin endometrium. No obvious sonographic cause of abnormal bleeding. Cyst on right ovary, not well visualized transabdominally. Consider follow up in 3 months.       General medical, surgical, OB/Gyn and social histories   reviewed and updated in Histories section of [facility].     OBJECTIVE:  /75   Pulse 64   Ht 1.748 m (5' 8.8\")   Wt 63.9 kg (140 lb 14.4 oz)   BMI 20.93 kg/m    BMI: Body mass index is 20.93 kg/m .  GENERAL APPEARANCE: healthy, alert, and no distress  PSYCH: mentation appears normal and affect normal/bright  ABDOMEN: Soft without masses or tenderness.  No CVA tenderness.   PELVIC EXAM: deferred     Hgb 10.2    ASSESSMENT/PLAN:  1. Vaginal bleeding  - Reviewed that bleeding for 3-6 months is considered normal with new LNG IUD. Discussed options to help include high dose NSAIDs or combined oral contraceptive pills. Pt is not interested in additional " hormonal pills. Will plan 800mg ibuprofen TID with food.   - Hemoglobin POCT 10.2. Pt has history of BA and alpha thalassemia. Discussed ways to increase iron in the diet and gave handout regarding this.     2. IUD (intrauterine device) in place  - US reassuring    3. Cyst of right ovary  - Follow up with repeat imaging in 3 months    32 minutes spent on the date of the encounter doing chart review, history and exam, documentation and further activities per the note.    JOSEFINA Uriarte, CNM

## 2023-12-27 NOTE — PATIENT INSTRUCTIONS
Thank you for trusting us with your care!     If you need to contact us for questions about:  Symptoms, Scheduling & Medical Questions; Non-urgent (2-3 day response) Virgil message, Urgent (needing response today) [phone] (if after 3:30pm next day response)   Prescriptions: Please call your Pharmacy   Billing: Jeff [phone] or [facility]:[phone]

## 2023-12-28 PROBLEM — N83.201 CYST OF RIGHT OVARY: Status: ACTIVE | Noted: 2023-12-28

## 2024-01-22 ENCOUNTER — OFFICE VISIT (OUTPATIENT)
Dept: DERMATOLOGY | Facility: CLINIC | Age: 24
End: 2024-01-22
Payer: COMMERCIAL

## 2024-01-22 DIAGNOSIS — L91.0 KELOID: Primary | ICD-10-CM

## 2024-01-22 PROCEDURE — 11900 INJECT SKIN LESIONS </W 7: CPT | Performed by: PHYSICIAN ASSISTANT

## 2024-01-22 ASSESSMENT — PAIN SCALES - GENERAL: PAINLEVEL: NO PAIN (0)

## 2024-01-22 NOTE — LETTER
1/22/2024       RE: Kaelyn Munoz  [address]  [place] [address]     Dear Colleague,    Thank you for referring your patient, Kaelyn Munoz, to the [facility] [place] at [facility]. Please see a copy of my visit note below.    [facility] Dermatology Note  Encounter Date: Jan 22, 2024  Office Visit     Dermatology Problem List:  1. Keloid, right posterior earlobe  -ILK injection 1/22/2024    ____________________________________________    Assessment & Plan:    # Keloid, right lobule.    - reviewed the benign nature of skin lesion with patient. As lesion is bothersome to patient, we discussed treatment with intralesional kenalog. At this time, patient would like to proceed with injection. Risks and benefits of procedure reviewed with patient.     Procedures Performed:   - Intra-lesional triamcinolone procedure note. After verbal consent and review of risk of pain and skin thinning/atrophy, positioning and cleansing with isopropyl alcohol, 0.15 total mL of triamcinolone 10 mg/mL was injected into one lesion(s) on the right posterior earlobe. The patient tolerated the procedure well and left the dermatology clinic in good condition .     Follow-up: recommended patient follow-up in 4-6 weeks but she is moving to the [facility] on 2/6 and will therefore follow-up as needed     Staff and Scribe:     Scribe Disclosure:   I, ANTONINO GOMEZ, am serving as a scribe; to document services personally performed by Emily Woo PA-C-based on data collection and the provider's statements to me.    Patient was discussed with and evaluated by JOSE Malhotra PA-C  Provider Disclosure:   The documentation recorded by the scribe accurately reflects the services I personally performed and the decisions made by me.    All risks, benefits and alternatives were discussed with  patient.  Patient is in agreement and understands the assessment and plan.  All questions were answered.  Sun Screen Education was given.   Return to Clinic as needed. ([place] moving [place])   Emily Woo PA-C   [facility]      ____________________________________________    CC: Derm Problem (Bump on the back of the right ear.  10 months.  )    HPI:  Ms. Kaelyn Munoz is a(n) 23 year old female who presents today as a new patient for evaluation of a lesion on her right posterior earlobe.  This lesion has been present for approximately 10 months.  She previously had a ear piercing here approximately 4 years ago.  This lesion is very bothersome to her.  She is wondering about potential treatment.  She has tried treating the lesion with over-the-counter cortisone which was not successful.  She has no concerns today.    Patient is otherwise feeling well, without additional skin concerns.    Labs Reviewed:  N/A    Physical Exam:  Vitals: There were no vitals taken for this visit.  SKIN: Focused examination of right ear was performed.  - Right posterior earlobe, firm, flesh colored papule that extends beyond the scar margin   - No other lesions of concern on areas examined.     Medications:  Current Outpatient Medications   Medication    acetaminophen (TYLENOL) 325 MG tablet    escitalopram (LEXAPRO) 10 MG tablet    ibuprofen (ADVIL/MOTRIN) 600 MG tablet    ondansetron (ZOFRAN ODT) 4 MG ODT tab    oxyCODONE (ROXICODONE) 5 MG tablet     Current Facility-Administered Medications   Medication    levonorgestrel (MIRENA) 52 MG (20 mcg/day) IUD 1 each      Past Medical History:   Patient Active Problem List   Diagnosis    Celiac disease    Seborrheic dermatitis    Moderate episode of recurrent major depressive disorder (H)    Iron deficiency anemia, unspecified iron deficiency anemia type    Alpha thalassemia trait    Menorrhagia with regular cycle    Endometrial polyp     Menses painful    Endometriosis    IUD (intrauterine device) in place - Mirena 11/3/2023    Cyst of right ovary     Past Medical History:   Diagnosis Date    Alpha thalassemia trait     Celiac disease         CC Kemar Rodriguez MD  [address] [facility] [address]  [place] [address] on close of this encounter.

## 2024-01-22 NOTE — PROGRESS NOTES
[facility] Dermatology Note  Encounter Date: Jan 22, 2024  Office Visit     Dermatology Problem List:  1. Keloid, right posterior earlobe  -ILK injection 1/22/2024    ____________________________________________    Assessment & Plan:    # Keloid, right lobule.    - reviewed the benign nature of skin lesion with patient. As lesion is bothersome to patient, we discussed treatment with intralesional kenalog. At this time, patient would like to proceed with injection. Risks and benefits of procedure reviewed with patient.     Procedures Performed:   - Intra-lesional triamcinolone procedure note. After verbal consent and review of risk of pain and skin thinning/atrophy, positioning and cleansing with isopropyl alcohol, 0.15 total mL of triamcinolone 10 mg/mL was injected into one lesion(s) on the right posterior earlobe. The patient tolerated the procedure well and left the dermatology clinic in good condition .     Follow-up: recommended patient follow-up in 4-6 weeks but she is moving to the [facility] on 2/6 and will therefore follow-up as needed     Staff and Scribe:     Scribe Disclosure:   I, ANTONINO GOMEZ, am serving as a scribe; to document services personally performed by Emily Woo PA-C-based on data collection and the provider's statements to me.    Patient was discussed with and evaluated by JOSE Malhotra PA-C  Provider Disclosure:   The documentation recorded by the scribe accurately reflects the services I personally performed and the decisions made by me.    All risks, benefits and alternatives were discussed with patient.  Patient is in agreement and understands the assessment and plan.  All questions were answered.  Sun Screen Education was given.   Return to Clinic as needed. ([place] moving [place])   Emily Woo PA-C   [facility]      ____________________________________________    CC: Derm Problem  (Bump on the back of the right ear.  10 months.  )    HPI:  Ms. Kaelyn Munoz is a(n) 23 year old female who presents today as a new patient for evaluation of a lesion on her right posterior earlobe.  This lesion has been present for approximately 10 months.  She previously had a ear piercing here approximately 4 years ago.  This lesion is very bothersome to her.  She is wondering about potential treatment.  She has tried treating the lesion with over-the-counter cortisone which was not successful.  She has no concerns today.    Patient is otherwise feeling well, without additional skin concerns.    Labs Reviewed:  N/A    Physical Exam:  Vitals: There were no vitals taken for this visit.  SKIN: Focused examination of right ear was performed.  - Right posterior earlobe, firm, flesh colored papule that extends beyond the scar margin   - No other lesions of concern on areas examined.     Medications:  Current Outpatient Medications   Medication    acetaminophen (TYLENOL) 325 MG tablet    escitalopram (LEXAPRO) 10 MG tablet    ibuprofen (ADVIL/MOTRIN) 600 MG tablet    ondansetron (ZOFRAN ODT) 4 MG ODT tab    oxyCODONE (ROXICODONE) 5 MG tablet     Current Facility-Administered Medications   Medication    levonorgestrel (MIRENA) 52 MG (20 mcg/day) IUD 1 each      Past Medical History:   Patient Active Problem List   Diagnosis    Celiac disease    Seborrheic dermatitis    Moderate episode of recurrent major depressive disorder (H)    Iron deficiency anemia, unspecified iron deficiency anemia type    Alpha thalassemia trait    Menorrhagia with regular cycle    Endometrial polyp    Menses painful    Endometriosis    IUD (intrauterine device) in place - Mirena 11/3/2023    Cyst of right ovary     Past Medical History:   Diagnosis Date    Alpha thalassemia trait     Celiac disease         CC Kemar Rodriguez MD  [address]  [place] [address] on close of this encounter.

## 2024-01-22 NOTE — NURSING NOTE
Dermatology Rooming Note    Kaelyn Munoz's goals for this visit include:   Chief Complaint   Patient presents with    Derm Problem     Bump on the back of the right ear.  10 months.       Dalila Cadena CMA

## 2024-01-22 NOTE — NURSING NOTE
Drug Administration Record    Prior to injection, verified patient identity using patient's name and date of birth.  Due to injection administration, patient instructed to remain in clinic for 15 minutes  afterwards, and to report any adverse reaction to me immediately.    Drug Name: triamcinolone acetonide(kenalog)  Dose: 1mL of triamcinolone 10mg/mL, 10mg dose  Route administered: ID  NDC #: 6629-3352-26  Amount of waste(mL):4mL  Reason for waste: Multi dose vial    LOT #: [id number]  SITE: see provider's note  : [facility]  EXPIRATION DATE: Dec 2025

## 2024-07-20 ENCOUNTER — HEALTH MAINTENANCE LETTER (OUTPATIENT)
Age: 24
End: 2024-07-20

## 2024-09-25 ENCOUNTER — OFFICE VISIT (OUTPATIENT)
Dept: FAMILY MEDICINE | Facility: CLINIC | Age: 24
End: 2024-09-25
Payer: COMMERCIAL

## 2024-09-25 VITALS
HEIGHT: 68 IN | SYSTOLIC BLOOD PRESSURE: 127 MMHG | DIASTOLIC BLOOD PRESSURE: 85 MMHG | OXYGEN SATURATION: 100 % | BODY MASS INDEX: 21.37 KG/M2 | TEMPERATURE: 97.7 F | WEIGHT: 141 LBS | HEART RATE: 82 BPM

## 2024-09-25 DIAGNOSIS — Z13.220 LIPID SCREENING: ICD-10-CM

## 2024-09-25 DIAGNOSIS — N83.201 CYST OF RIGHT OVARY: ICD-10-CM

## 2024-09-25 DIAGNOSIS — N94.6 DYSMENORRHEA: ICD-10-CM

## 2024-09-25 DIAGNOSIS — F33.1 MODERATE EPISODE OF RECURRENT MAJOR DEPRESSIVE DISORDER (H): ICD-10-CM

## 2024-09-25 DIAGNOSIS — D50.9 IRON DEFICIENCY ANEMIA, UNSPECIFIED IRON DEFICIENCY ANEMIA TYPE: ICD-10-CM

## 2024-09-25 DIAGNOSIS — Z13.0 SCREENING FOR DEFICIENCY ANEMIA: ICD-10-CM

## 2024-09-25 DIAGNOSIS — Z00.00 VISIT FOR PREVENTIVE HEALTH EXAMINATION: Primary | ICD-10-CM

## 2024-09-25 DIAGNOSIS — K90.0 CELIAC DISEASE: ICD-10-CM

## 2024-09-25 PROBLEM — N92.0 MENORRHAGIA WITH REGULAR CYCLE: Status: RESOLVED | Noted: 2023-10-02 | Resolved: 2024-09-25

## 2024-09-25 PROBLEM — F33.9 RECURRENT MAJOR DEPRESSION (H): Status: ACTIVE | Noted: 2023-06-04

## 2024-09-25 PROBLEM — N84.0 ENDOMETRIAL POLYP: Status: RESOLVED | Noted: 2023-10-02 | Resolved: 2024-09-25

## 2024-09-25 LAB
CHOLEST SERPL-MCNC: 181 MG/DL
ERYTHROCYTE [DISTWIDTH] IN BLOOD BY AUTOMATED COUNT: 18.7 % (ref 10–15)
FASTING STATUS PATIENT QL REPORTED: YES
FERRITIN SERPL-MCNC: 13 NG/ML (ref 6–175)
HCT VFR BLD AUTO: 35.9 % (ref 35–47)
HDLC SERPL-MCNC: 59 MG/DL
HGB BLD-MCNC: 11.1 G/DL (ref 11.7–15.7)
LDLC SERPL CALC-MCNC: 112 MG/DL
MCH RBC QN AUTO: 22.2 PG (ref 26.5–33)
MCHC RBC AUTO-ENTMCNC: 30.9 G/DL (ref 31.5–36.5)
MCV RBC AUTO: 72 FL (ref 78–100)
NONHDLC SERPL-MCNC: 122 MG/DL
PLATELET # BLD AUTO: 278 10E3/UL (ref 150–450)
RBC # BLD AUTO: 5.01 10E6/UL (ref 3.8–5.2)
TRIGL SERPL-MCNC: 51 MG/DL
VIT D+METAB SERPL-MCNC: 32 NG/ML (ref 20–50)
WBC # BLD AUTO: 5.5 10E3/UL (ref 4–11)

## 2024-09-25 PROCEDURE — 36415 COLL VENOUS BLD VENIPUNCTURE: CPT | Performed by: FAMILY MEDICINE

## 2024-09-25 PROCEDURE — 90472 IMMUNIZATION ADMIN EACH ADD: CPT | Performed by: FAMILY MEDICINE

## 2024-09-25 PROCEDURE — 90480 ADMN SARSCOV2 VAC 1/ONLY CMP: CPT | Performed by: FAMILY MEDICINE

## 2024-09-25 PROCEDURE — 99395 PREV VISIT EST AGE 18-39: CPT | Mod: 25 | Performed by: FAMILY MEDICINE

## 2024-09-25 PROCEDURE — 82728 ASSAY OF FERRITIN: CPT | Performed by: FAMILY MEDICINE

## 2024-09-25 PROCEDURE — 99213 OFFICE O/P EST LOW 20 MIN: CPT | Mod: 25 | Performed by: FAMILY MEDICINE

## 2024-09-25 PROCEDURE — 90715 TDAP VACCINE 7 YRS/> IM: CPT | Performed by: FAMILY MEDICINE

## 2024-09-25 PROCEDURE — 91320 SARSCV2 VAC 30MCG TRS-SUC IM: CPT | Performed by: FAMILY MEDICINE

## 2024-09-25 PROCEDURE — 80061 LIPID PANEL: CPT | Performed by: FAMILY MEDICINE

## 2024-09-25 PROCEDURE — 90471 IMMUNIZATION ADMIN: CPT | Performed by: FAMILY MEDICINE

## 2024-09-25 PROCEDURE — 82306 VITAMIN D 25 HYDROXY: CPT | Performed by: FAMILY MEDICINE

## 2024-09-25 PROCEDURE — 85027 COMPLETE CBC AUTOMATED: CPT | Performed by: FAMILY MEDICINE

## 2024-09-25 PROCEDURE — 90656 IIV3 VACC NO PRSV 0.5 ML IM: CPT | Performed by: FAMILY MEDICINE

## 2024-09-25 RX ORDER — DICYCLOMINE HCL 20 MG
TABLET ORAL
COMMUNITY
Start: 2024-06-15

## 2024-09-25 RX ORDER — ESCITALOPRAM OXALATE 10 MG/1
10 TABLET ORAL DAILY
Qty: 90 TABLET | Refills: 3 | Status: SHIPPED | OUTPATIENT
Start: 2024-09-25

## 2024-09-25 SDOH — HEALTH STABILITY: PHYSICAL HEALTH: ON AVERAGE, HOW MANY DAYS PER WEEK DO YOU ENGAGE IN MODERATE TO STRENUOUS EXERCISE (LIKE A BRISK WALK)?: 5 DAYS

## 2024-09-25 ASSESSMENT — PATIENT HEALTH QUESTIONNAIRE - PHQ9
SUM OF ALL RESPONSES TO PHQ QUESTIONS 1-9: 3
10. IF YOU CHECKED OFF ANY PROBLEMS, HOW DIFFICULT HAVE THESE PROBLEMS MADE IT FOR YOU TO DO YOUR WORK, TAKE CARE OF THINGS AT HOME, OR GET ALONG WITH OTHER PEOPLE: NOT DIFFICULT AT ALL
SUM OF ALL RESPONSES TO PHQ QUESTIONS 1-9: 3

## 2024-09-25 ASSESSMENT — PAIN SCALES - GENERAL: PAINLEVEL: NO PAIN (0)

## 2024-09-25 ASSESSMENT — SOCIAL DETERMINANTS OF HEALTH (SDOH): HOW OFTEN DO YOU GET TOGETHER WITH FRIENDS OR RELATIVES?: TWICE A WEEK

## 2024-09-25 NOTE — PROGRESS NOTES
Assessment/Plan    Preventive.  -see below orders for screening tests and immunizations    Major depression. Ok control.  -encouraged Kaelyn to consider behavioral therapy. Discussed that EAP may be an option at her work  -continue escitalopram 10 mg/d. Kaelyn declines dose increase    Dysmenorrhea. Improved w/ Mirena.  -continue Mirena    Breakthrough bleeding w/ Mirena. Kaelyn feels that this is not overly bothersome.    Celiac disease. Low vitamin D and iron levels in past.  -recheck vitamin D level  -recheck iron level    Daily cannabis use.  -discussed risk of mood changes, withdrawal    Right ovarian cyst noted 2023. Poorly visualized on initial U/S.  -repeat U/S    F/u in 1 year for preventive.    Orders Placed This Encounter   Procedures    US Pelvic Complete with Transvaginal    TDAP 10-64Y (ADACEL,BOOSTRIX)    INFLUENZA VACCINE,SPLIT VIRUS,TRIVALENT,PF(FLUZONE)    COVID-19 12+ (PFIZER)    CBC with platelets    Vitamin D Deficiency    Lipid panel reflex to direct LDL Fasting    Ferritin     ----  Chief complaint: Physical    Social History     Social History Narrative    -Grew up in [place]    -Lives with roommates    -Moved to [place], [place] area in winter 2024    -Works as volunteer supervisor and heather analyst at [employer]    -Hoping for career in [employer] education        Updated 2024     More depressed around . Grandmother  earlier in the year. Also challenging social transition following move. Denies significant anxiety. Rarely takes hydroxyzine. Continues Lexapro; denies adverse effects.    Hx of low iron. Menses are lighter following Mirena placement. Still w/ light bleeding most days. Past intolerance of oral iron (nausea). Tolerated IV iron through [facility]. Denies recent lightheadedness.    Denies STI concerns.    Exam  /85 (BP Location: Right arm, Patient Position: Sitting, Cuff Size: Adult Regular)   Pulse 82   Temp 97.7  F (36.5  C) (Temporal)   Ht 1.735 m (5'  "8.31\")   Wt 64 kg (141 lb)   LMP  (LMP Unknown)   SpO2 100%   BMI 21.25 kg/m    No LMP recorded (lmp unknown). (Menstrual status: IUD).    oropharynx without abnormal masses  lung fields CTAB  heart with regular rate and rhythm, no murmurs  no lower leg edema  "

## 2024-09-25 NOTE — PROGRESS NOTES
Preventive Care Visit  [facility]  Kemar Rodriguez MD, Family Medicine  Sep 25, 2024  {Provider  Link to SmartSet :131042}    {PROVIDER CHARTING PREFERENCE:571555}    Matilde Art is a 24 year old, presenting for the following:  Physical        9/25/2024     9:55 AM   Additional Questions   Roomed by Shayy CORTES        Health Care Directive  Patient does not have a Health Care Directive or Living Will: {ADVANCE_DIRECTIVE_STATUS:854629}    HPI  ***  {MA/LPN/RN Pre-Provider Visit Orders- hCG/UA/Strep (Optional):267996}  {SUPERLIST (Optional):654412}  {additonal problems for provider to add (Optional):608257}      9/25/2024   General Health   How would you rate your overall physical health? Good   Feel stress (tense, anxious, or unable to sleep) Not at all            9/25/2024   Nutrition   Three or more servings of calcium each day? Yes   Diet: Gluten-free/reduced   How many servings of fruit and vegetables per day? (!) 2-3   How many sweetened beverages each day? 0-1            9/25/2024   Exercise   Days per week of moderate/strenous exercise 5 days            9/25/2024   Social Factors   Frequency of gathering with friends or relatives Twice a week   Worry food won't last until get money to buy more No   Food not last or not have enough money for food? No   Do you have housing? (Housing is defined as stable permanent housing and does not include staying ouside in a car, in a tent, in an abandoned building, in an overnight shelter, or couch-surfing.) Yes   Are you worried about losing your housing? No   Lack of transportation? No   Unable to get utilities (heat,electricity)? No            9/25/2024   Dental   Dentist two times every year? Yes            9/25/2024   TB Screening   Were you born outside of the [place]? No          Today's PHQ-9 Score:       9/25/2024     9:34 AM   PHQ-9 SCORE   PHQ-9 Total Score MyChart 3 (Minimal depression)   PHQ-9 Total Score 3          "9/25/2024   Substance Use   Alcohol more than 3/day or more than 7/wk No   Do you use any other substances recreationally? (!) CANNABIS PRODUCTS        Social History     Tobacco Use    Smoking status: Never    Smokeless tobacco: Never   Vaping Use    Vaping status: Never Used   Substance Use Topics    Alcohol use: Yes    Drug use: Yes     Types: Marijuana     {Provider  If there are gaps in the social history shown above, please follow the link to update and then refresh the note Link to Social and Substance History :430554}      9/25/2024   STI Screening   New sexual partner(s) since last STI/HIV test? No        History of abnormal Pap smear: { :235335}             9/25/2024   Contraception/Family Planning   Questions about contraception or family planning No        {Provider  REQUIRED FOR AWV Use the storyboard to review patient history, after sections have been marked as reviewed, refresh note to capture documentation:651123}   Reviewed and updated as needed this visit by Provider                    {HISTORY OPTIONS (Optional):646172}    {ROS Picklists (Optional):911274}     Objective    Exam  /85 (BP Location: Right arm, Patient Position: Sitting, Cuff Size: Adult Regular)   Pulse 82   Temp 97.7  F (36.5  C) (Temporal)   Ht 1.735 m (5' 8.31\")   Wt 64 kg (141 lb)   LMP  (LMP Unknown)   SpO2 100%   BMI 21.25 kg/m     Estimated body mass index is 21.25 kg/m  as calculated from the following:    Height as of this encounter: 1.735 m (5' 8.31\").    Weight as of this encounter: 64 kg (141 lb).    Physical Exam  {Exam Choices (Optional):811346}        Signed Electronically by: Kemar Rodriguez MD  {Email feedback regarding this note to [email or web address]   :393712}  "

## 2024-09-29 RX ORDER — ANTIARTHRITIC COMBINATION NO.2 900 MG
1 TABLET ORAL DAILY
Status: SHIPPED
Start: 2024-09-29

## 2025-08-26 ENCOUNTER — PATIENT OUTREACH (OUTPATIENT)
Dept: CARE COORDINATION | Facility: CLINIC | Age: 25
End: 2025-08-26
Payer: COMMERCIAL

## (undated) DEVICE — SOLIDIFIER (USE FOR UP TO 1500CC) MSOLID1500

## (undated) DEVICE — LINEN POUCH DBL 5427

## (undated) DEVICE — KIT PATIENT POSITIONING PIGAZZI LATEX FREE 40580

## (undated) DEVICE — TUBING IRRIG CYSTO/BLADDER SET 81" LF 2C4040

## (undated) DEVICE — PANTIES MESH LG/XLG 2PK 706M2

## (undated) DEVICE — PAD PERI W/WINGS 11"

## (undated) DEVICE — CATH TRAY FOLEY SURESTEP 16FR W/DRAIN BAG LF A300416A

## (undated) DEVICE — Device

## (undated) DEVICE — DRAPE LAVH/LAPAROSCOPY W/POUCH 29474

## (undated) DEVICE — DEVICE TISSUE REMOVAL HYSTEROSCOPIC MYOSURE LITE 30-401LITE

## (undated) DEVICE — PREP CHLORHEXIDINE 4% 4OZ (HIBICLENS) 57504

## (undated) DEVICE — KIT PROCEDURE FLUENT IN/OUT FLOWPAK TISS TRAP FLT-112S

## (undated) DEVICE — PREP CHLORAPREP 26ML TINTED ORANGE  260815

## (undated) DEVICE — DRAPE UNDER BUTTOCK 8483

## (undated) DEVICE — LINEN TOWEL PACK X5 5464

## (undated) DEVICE — SU MONOCRYL 4-0 PS-2 27" UND Y426H

## (undated) DEVICE — NDL INSUFFLATION 13GA 120MM C2201

## (undated) DEVICE — SYR 10ML FINGER CONTROL W/O NDL 309695

## (undated) DEVICE — SOL WATER IRRIG 500ML BOTTLE 2F7113

## (undated) DEVICE — SOL NACL 0.9% IRRIG 500ML BOTTLE 2F7123

## (undated) DEVICE — ENDO TROCAR FIRST ENTRY KII FIOS Z-THRD 05X100MM CTF03

## (undated) DEVICE — SU DERMABOND ADVANCED .7ML DNX12

## (undated) DEVICE — JELLY LUBRICATING SURGILUBE 2OZ TUBE

## (undated) DEVICE — PREP SKIN SCRUB TRAY 4461A

## (undated) DEVICE — SUCTION IRR STRYKERFLOW II W/TIP 250-070-520

## (undated) DEVICE — ENDO TROCAR SLEEVE KII Z-THREADED 05X100MM CTS02

## (undated) DEVICE — SOL NACL 0.9% IRRIG 3000ML BAG 2B7477

## (undated) DEVICE — SU VICRYL 0 TIE 12X18" J906G

## (undated) DEVICE — SEAL SET MYOSURE ROD LENS SCOPE SINGLE USE 40-902

## (undated) RX ORDER — FENTANYL CITRATE 50 UG/ML
INJECTION, SOLUTION INTRAMUSCULAR; INTRAVENOUS
Status: DISPENSED
Start: 2023-11-03

## (undated) RX ORDER — GLYCOPYRROLATE 0.2 MG/ML
INJECTION INTRAMUSCULAR; INTRAVENOUS
Status: DISPENSED
Start: 2023-11-03

## (undated) RX ORDER — BUPIVACAINE HYDROCHLORIDE 2.5 MG/ML
INJECTION, SOLUTION EPIDURAL; INFILTRATION; INTRACAUDAL
Status: DISPENSED
Start: 2023-11-03

## (undated) RX ORDER — PROPOFOL 10 MG/ML
INJECTION, EMULSION INTRAVENOUS
Status: DISPENSED
Start: 2023-11-03

## (undated) RX ORDER — ACETAMINOPHEN 325 MG/1
TABLET ORAL
Status: DISPENSED
Start: 2023-11-03

## (undated) RX ORDER — OXYCODONE HYDROCHLORIDE 5 MG/1
TABLET ORAL
Status: DISPENSED
Start: 2023-11-03

## (undated) RX ORDER — DEXAMETHASONE SODIUM PHOSPHATE 4 MG/ML
INJECTION, SOLUTION INTRA-ARTICULAR; INTRALESIONAL; INTRAMUSCULAR; INTRAVENOUS; SOFT TISSUE
Status: DISPENSED
Start: 2023-11-03

## (undated) RX ORDER — ONDANSETRON 2 MG/ML
INJECTION INTRAMUSCULAR; INTRAVENOUS
Status: DISPENSED
Start: 2023-11-03

## (undated) RX ORDER — KETOROLAC TROMETHAMINE 30 MG/ML
INJECTION, SOLUTION INTRAMUSCULAR; INTRAVENOUS
Status: DISPENSED
Start: 2023-11-03

## (undated) RX ORDER — HYDROMORPHONE HYDROCHLORIDE 1 MG/ML
INJECTION, SOLUTION INTRAMUSCULAR; INTRAVENOUS; SUBCUTANEOUS
Status: DISPENSED
Start: 2023-11-03